# Patient Record
Sex: FEMALE | Race: WHITE | Employment: FULL TIME | URBAN - NONMETROPOLITAN AREA
[De-identification: names, ages, dates, MRNs, and addresses within clinical notes are randomized per-mention and may not be internally consistent; named-entity substitution may affect disease eponyms.]

---

## 2017-08-21 ENCOUNTER — HOSPITAL ENCOUNTER (EMERGENCY)
Age: 32
Discharge: HOME | End: 2017-08-21
Payer: COMMERCIAL

## 2017-08-21 VITALS
OXYGEN SATURATION: 98 % | TEMPERATURE: 98.3 F | HEART RATE: 82 BPM | RESPIRATION RATE: 20 BRPM | BODY MASS INDEX: 45.66 KG/M2 | SYSTOLIC BLOOD PRESSURE: 202 MMHG | WEIGHT: 266 LBS | DIASTOLIC BLOOD PRESSURE: 130 MMHG

## 2017-08-21 DIAGNOSIS — I10 UNCONTROLLED HYPERTENSION: Primary | ICD-10-CM

## 2017-08-21 DIAGNOSIS — J01.10 ACUTE NON-RECURRENT FRONTAL SINUSITIS: ICD-10-CM

## 2017-08-21 DIAGNOSIS — Z86.79 HISTORY OF ESSENTIAL HYPERTENSION: ICD-10-CM

## 2017-08-21 PROCEDURE — 6370000000 HC RX 637 (ALT 250 FOR IP): Performed by: NURSE PRACTITIONER

## 2017-08-21 PROCEDURE — 99212 OFFICE O/P EST SF 10 MIN: CPT

## 2017-08-21 PROCEDURE — 99213 OFFICE O/P EST LOW 20 MIN: CPT | Performed by: NURSE PRACTITIONER

## 2017-08-21 RX ORDER — CLONIDINE HYDROCHLORIDE 0.1 MG/1
0.1 TABLET ORAL ONCE
Status: COMPLETED | OUTPATIENT
Start: 2017-08-21 | End: 2017-08-21

## 2017-08-21 RX ORDER — LISINOPRIL 10 MG/1
10 TABLET ORAL DAILY
Qty: 30 TABLET | Refills: 0 | Status: SHIPPED | OUTPATIENT
Start: 2017-08-21 | End: 2019-06-20 | Stop reason: ALTCHOICE

## 2017-08-21 RX ORDER — AMOXICILLIN AND CLAVULANATE POTASSIUM 875; 125 MG/1; MG/1
1 TABLET, FILM COATED ORAL 2 TIMES DAILY
Qty: 20 TABLET | Refills: 0 | Status: SHIPPED | OUTPATIENT
Start: 2017-08-21 | End: 2017-08-31

## 2017-08-21 RX ADMIN — CLONIDINE HYDROCHLORIDE 0.1 MG: 0.1 TABLET ORAL at 19:27

## 2017-08-21 ASSESSMENT — ENCOUNTER SYMPTOMS
WHEEZING: 0
COUGH: 1
TROUBLE SWALLOWING: 0
CHEST TIGHTNESS: 0
SINUS PRESSURE: 1
SHORTNESS OF BREATH: 0
SORE THROAT: 0
RHINORRHEA: 1
VOMITING: 0

## 2017-08-21 ASSESSMENT — PAIN SCALES - GENERAL: PAINLEVEL_OUTOF10: 5

## 2017-08-21 ASSESSMENT — PAIN DESCRIPTION - DESCRIPTORS: DESCRIPTORS: ACHING

## 2017-08-21 ASSESSMENT — PAIN DESCRIPTION - PAIN TYPE: TYPE: ACUTE PAIN

## 2017-08-21 ASSESSMENT — PAIN DESCRIPTION - LOCATION: LOCATION: HEAD

## 2017-08-22 ENCOUNTER — TELEPHONE (OUTPATIENT)
Dept: INTERNAL MEDICINE CLINIC | Age: 32
End: 2017-08-22

## 2019-06-20 ENCOUNTER — OFFICE VISIT (OUTPATIENT)
Dept: FAMILY MEDICINE CLINIC | Age: 34
End: 2019-06-20
Payer: COMMERCIAL

## 2019-06-20 VITALS
WEIGHT: 273 LBS | DIASTOLIC BLOOD PRESSURE: 96 MMHG | SYSTOLIC BLOOD PRESSURE: 136 MMHG | BODY MASS INDEX: 46.61 KG/M2 | HEIGHT: 64 IN | TEMPERATURE: 98.7 F | RESPIRATION RATE: 20 BRPM | HEART RATE: 80 BPM

## 2019-06-20 DIAGNOSIS — F32.1 CURRENT MODERATE EPISODE OF MAJOR DEPRESSIVE DISORDER WITHOUT PRIOR EPISODE (HCC): ICD-10-CM

## 2019-06-20 DIAGNOSIS — I10 ESSENTIAL HYPERTENSION: ICD-10-CM

## 2019-06-20 DIAGNOSIS — Z13.31 POSITIVE DEPRESSION SCREENING: ICD-10-CM

## 2019-06-20 DIAGNOSIS — E11.65 TYPE 2 DIABETES MELLITUS WITH HYPERGLYCEMIA, WITHOUT LONG-TERM CURRENT USE OF INSULIN (HCC): Primary | ICD-10-CM

## 2019-06-20 PROCEDURE — G8431 POS CLIN DEPRES SCRN F/U DOC: HCPCS | Performed by: FAMILY MEDICINE

## 2019-06-20 PROCEDURE — 99204 OFFICE O/P NEW MOD 45 MIN: CPT | Performed by: FAMILY MEDICINE

## 2019-06-20 PROCEDURE — G0444 DEPRESSION SCREEN ANNUAL: HCPCS | Performed by: FAMILY MEDICINE

## 2019-06-20 RX ORDER — LOSARTAN POTASSIUM AND HYDROCHLOROTHIAZIDE 25; 100 MG/1; MG/1
TABLET ORAL
Qty: 30 TABLET | Refills: 11 | Status: CANCELLED | OUTPATIENT
Start: 2019-06-20

## 2019-06-20 RX ORDER — ALOGLIPTIN 25 MG/1
TABLET, FILM COATED ORAL
Refills: 5 | COMMUNITY
Start: 2019-05-13 | End: 2019-07-25 | Stop reason: ALTCHOICE

## 2019-06-20 RX ORDER — GLIPIZIDE 5 MG/1
TABLET, FILM COATED, EXTENDED RELEASE ORAL
Refills: 10 | COMMUNITY
Start: 2019-06-08 | End: 2020-02-07

## 2019-06-20 RX ORDER — GLIPIZIDE 5 MG/1
TABLET, FILM COATED, EXTENDED RELEASE ORAL
Qty: 30 TABLET | Refills: 11 | Status: CANCELLED | OUTPATIENT
Start: 2019-06-20

## 2019-06-20 RX ORDER — METFORMIN HYDROCHLORIDE 1000 MG/1
1000 TABLET, FILM COATED, EXTENDED RELEASE ORAL 2 TIMES DAILY
Qty: 60 TABLET | Refills: 11 | Status: SHIPPED | OUTPATIENT
Start: 2019-06-20 | End: 2020-06-22

## 2019-06-20 RX ORDER — LOSARTAN POTASSIUM AND HYDROCHLOROTHIAZIDE 25; 100 MG/1; MG/1
TABLET ORAL
Refills: 7 | COMMUNITY
Start: 2019-06-14 | End: 2020-02-07 | Stop reason: ALTCHOICE

## 2019-06-20 RX ORDER — ESCITALOPRAM OXALATE 10 MG/1
10 TABLET ORAL DAILY
Qty: 30 TABLET | Refills: 3 | Status: SHIPPED | OUTPATIENT
Start: 2019-06-20 | End: 2019-11-01 | Stop reason: SDUPTHER

## 2019-06-20 ASSESSMENT — PATIENT HEALTH QUESTIONNAIRE - PHQ9
9. THOUGHTS THAT YOU WOULD BE BETTER OFF DEAD, OR OF HURTING YOURSELF: 1
SUM OF ALL RESPONSES TO PHQ QUESTIONS 1-9: 16
7. TROUBLE CONCENTRATING ON THINGS, SUCH AS READING THE NEWSPAPER OR WATCHING TELEVISION: 3
4. FEELING TIRED OR HAVING LITTLE ENERGY: 2
10. IF YOU CHECKED OFF ANY PROBLEMS, HOW DIFFICULT HAVE THESE PROBLEMS MADE IT FOR YOU TO DO YOUR WORK, TAKE CARE OF THINGS AT HOME, OR GET ALONG WITH OTHER PEOPLE: 1
SUM OF ALL RESPONSES TO PHQ QUESTIONS 1-9: 16
6. FEELING BAD ABOUT YOURSELF - OR THAT YOU ARE A FAILURE OR HAVE LET YOURSELF OR YOUR FAMILY DOWN: 3
SUM OF ALL RESPONSES TO PHQ9 QUESTIONS 1 & 2: 4
8. MOVING OR SPEAKING SO SLOWLY THAT OTHER PEOPLE COULD HAVE NOTICED. OR THE OPPOSITE, BEING SO FIGETY OR RESTLESS THAT YOU HAVE BEEN MOVING AROUND A LOT MORE THAN USUAL: 0
1. LITTLE INTEREST OR PLEASURE IN DOING THINGS: 3
5. POOR APPETITE OR OVEREATING: 1
2. FEELING DOWN, DEPRESSED OR HOPELESS: 1
3. TROUBLE FALLING OR STAYING ASLEEP: 2

## 2019-06-20 ASSESSMENT — ENCOUNTER SYMPTOMS
DIARRHEA: 0
RHINORRHEA: 0
CONSTIPATION: 0
ABDOMINAL PAIN: 0
VOMITING: 0
SORE THROAT: 0
BACK PAIN: 0
NAUSEA: 0

## 2019-06-21 NOTE — PROGRESS NOTES
disturbance. Negative for suicidal ideas. The patient is nervous/anxious. Past Medical History:   Diagnosis Date    Diabetes mellitus ([address]     Hypertension       No past surgical history on file. Family History   Problem Relation Age of Onset    Diabetes Mother     Depression Mother     High Blood Pressure Father     Other Father     High Blood Pressure Paternal Grandmother      Social History     Tobacco Use    Smoking status: Former Smoker     Years: 2.00     Types: Cigarettes     Last attempt to quit: 10/1/2016     Years since quittin.7    Smokeless tobacco: Never Used    Tobacco comment: 2-3 cigarettes per day   Substance Use Topics    Alcohol use: Yes     Comment: social      Current Outpatient Medications   Medication Sig Dispense Refill    alogliptin (NESINA) 25 MG TABS tablet TAKE 1 TABLET PO QD  5    glipiZIDE (GLUCOTROL XL) 5 MG extended release tablet TK 1 T PO ONCE D WITH BREAKFAST  10    losartan-hydrochlorothiazide (HYZAAR) 100-25 MG per tablet TK 1 T PO QD  7    metFORMIN, MOD, (GLUMETZA) 1000 MG extended release tablet Take 1 tablet by mouth 2 times daily 60 tablet 11    escitalopram (LEXAPRO) 10 MG tablet Take 1 tablet by mouth daily 30 tablet 3     No current facility-administered medications for this visit. No Known Allergies  Health Maintenance   Topic Date Due    Varicella Vaccine (1 of 2 - 13+ 2-dose series) 1996    HIV screen  1998    DTaP/Tdap/Td vaccine (1 - Tdap) 2002    Cervical cancer screen  2004    Potassium monitoring  02/10/2017    Creatinine monitoring  02/10/2017    Flu vaccine (Season Ended) 2019    Pneumococcal 0-64 years Vaccine  Aged Out         Objective:     Physical Exam   Constitutional: She is oriented to person, place, and time. She appears well-developed and well-nourished. HENT:   Head: Normocephalic and atraumatic.    Right Ear: External ear normal.   Left Ear: External ear normal.   Nose: Nose normal.   Mouth/Throat: Oropharynx is clear and moist. No oropharyngeal exudate. Eyes: Pupils are equal, round, and reactive to light. Conjunctivae and EOM are normal. Right eye exhibits no discharge. Left eye exhibits no discharge. Neck: No thyromegaly present. No carotid bruits   Cardiovascular: Normal rate, regular rhythm, normal heart sounds and intact distal pulses. Exam reveals no gallop and no friction rub. No murmur heard. Pulmonary/Chest: Breath sounds normal. She has no wheezes. She has no rales. Abdominal: Soft. Bowel sounds are normal. She exhibits no distension and no mass. There is no tenderness. There is no rebound and no guarding. Musculoskeletal: Normal range of motion. She exhibits no edema or tenderness. Lymphadenopathy:     She has no cervical adenopathy. Neurological: She is alert and oriented to person, place, and time. She displays normal reflexes. No cranial nerve deficit. Coordination normal.   Skin: Skin is warm and dry. No rash noted. No erythema. No pallor. Psychiatric: She has a normal mood and affect. Vitals reviewed. BP (!) 136/96 Comment: left  Pulse 80   Temp 98.7 °F (37.1 °C) (Oral)   Resp 20   Ht 5' 4.25\" (1.632 m)   Wt 273 lb (123.8 kg)   BMI 46.50 kg/m²       Impression/Plan:  1. Type 2 diabetes mellitus with hyperglycemia, without long-term current use of insulin ([facility]    2. Current moderate episode of major depressive disorder without prior episode [facility]    3. Essential hypertension    4.  Positive depression screening      Requested Prescriptions     Signed Prescriptions Disp Refills    metFORMIN, MOD, (GLUMETZA) 1000 MG extended release tablet 60 tablet 11     Sig: Take 1 tablet by mouth 2 times daily    escitalopram (LEXAPRO) 10 MG tablet 30 tablet 3     Sig: Take 1 tablet by mouth daily     Orders Placed This Encounter   Procedures    Lipid Panel     Standing Status:   Future     Standing Expiration Date:   6/19/2020     Order Specific Question:   Is Patient Fasting?/# of Hours     Answer:   yes/12    Comprehensive Metabolic Panel     Standing Status:   Future     Standing Expiration Date:   6/19/2020    Hemoglobin A1C     Standing Status:   Future     Standing Expiration Date:   6/19/2020    Microalbumin / Creatinine Urine Ratio     Standing Status:   Future     Standing Expiration Date:   6/20/2020    Positive Screen for Clinical Depression with a Documented Follow-up Plan    45 minutes face-to-face under for some time spent on counseling regarding treatment of depression, diabetic diet exercise and diet for control of blood sugars and blood pressure as well as weight control    Patient giveneducational materials - see patient instructions. Discussed use, benefit, and side effects of prescribed medications. All patient questions answered. Pt voiced understanding. Reviewed health maintenance. Patient agreedwith treatment plan. Follow up as directed. **This report has been created using voice recognition software. It may contain minor errorswhich are inherent in voice recognition technology. **       Electronically signed by Mark Cm MD on 6/20/2019 at 8:01 PM

## 2019-07-17 LAB
ALBUMIN SERPL-MCNC: 4.4 G/DL (ref 3.2–5.3)
ALK PHOSPHATASE: 88 U/L (ref 39–130)
ALT SERPL-CCNC: 160 U/L (ref 0–31)
ANION GAP SERPL CALCULATED.3IONS-SCNC: 10 MMOL/L (ref 4–12)
AST SERPL-CCNC: 73 U/L (ref 0–41)
AVERAGE GLUCOSE: 197 MG/DL (ref 66–114)
BILIRUB SERPL-MCNC: 0.8 MG/DL (ref 0.3–1.2)
BUN BLDV-MCNC: 11 MG/DL (ref 5–23)
CALCIUM SERPL-MCNC: 9.3 MG/DL (ref 8.5–10.5)
CHLORIDE BLD-SCNC: 99 MMOL/L (ref 98–109)
CHOLESTEROL/HDL RATIO: 4.5 (ref 1–5)
CHOLESTEROL: 210 MG/DL (ref 150–200)
CO2: 26 MMOL/L (ref 22–32)
CREAT SERPL-MCNC: 0.62 MG/DL (ref 0.4–1)
CREATINE, URINE: 71.94 MG/DL
EGFR AFRICAN AMERICAN: >60 ML/MIN/1.73SQ.M
EGFR IF NONAFRICAN AMERICAN: >60 ML/MIN/1.73SQ.M
GLUCOSE: 156 MG/DL (ref 65–99)
HBA1C MFR BLD: 8.5 %
HDLC SERPL-MCNC: 47 MG/DL
LDL CHOLESTEROL CALCULATED: 135 MG/DL
LDL/HDL RATIO: 2.9
MICROALBUMIN/CREAT 24H UR: 3.1 MG/DL (ref 0–1.9)
MICROALBUMIN/CREAT UR-RTO: 43.1 MG/G CREAT (ref 0–30)
POTASSIUM SERPL-SCNC: 4.1 MMOL/L (ref 3.5–5)
SODIUM BLD-SCNC: 135 MMOL/L (ref 134–146)
TOTAL PROTEIN: 7 G/DL (ref 6–8)
TRIGL SERPL-MCNC: 140 MG/DL (ref 27–150)
VLDLC SERPL CALC-MCNC: 28 MG/DL (ref 0–30)

## 2019-07-25 ENCOUNTER — OFFICE VISIT (OUTPATIENT)
Dept: FAMILY MEDICINE CLINIC | Age: 34
End: 2019-07-25
Payer: COMMERCIAL

## 2019-07-25 VITALS
RESPIRATION RATE: 16 BRPM | HEART RATE: 72 BPM | WEIGHT: 267.2 LBS | TEMPERATURE: 98 F | BODY MASS INDEX: 45.51 KG/M2 | DIASTOLIC BLOOD PRESSURE: 80 MMHG | SYSTOLIC BLOOD PRESSURE: 130 MMHG

## 2019-07-25 DIAGNOSIS — E11.65 TYPE 2 DIABETES MELLITUS WITH HYPERGLYCEMIA, WITHOUT LONG-TERM CURRENT USE OF INSULIN (HCC): Primary | ICD-10-CM

## 2019-07-25 DIAGNOSIS — B37.2 YEAST DERMATITIS: ICD-10-CM

## 2019-07-25 DIAGNOSIS — F32.1 CURRENT MODERATE EPISODE OF MAJOR DEPRESSIVE DISORDER WITHOUT PRIOR EPISODE (HCC): ICD-10-CM

## 2019-07-25 PROCEDURE — 99213 OFFICE O/P EST LOW 20 MIN: CPT | Performed by: FAMILY MEDICINE

## 2019-07-25 RX ORDER — NYSTATIN 100000 [USP'U]/G
POWDER TOPICAL
Qty: 60 G | Refills: 1 | Status: SHIPPED | OUTPATIENT
Start: 2019-07-25 | End: 2020-03-02 | Stop reason: ALTCHOICE

## 2019-07-25 ASSESSMENT — ENCOUNTER SYMPTOMS
VOMITING: 0
RHINORRHEA: 0
DIARRHEA: 0
ABDOMINAL PAIN: 0
COUGH: 0
EYES NEGATIVE: 1
CHEST TIGHTNESS: 0
BACK PAIN: 0
SORE THROAT: 0
SHORTNESS OF BREATH: 0
NAUSEA: 0

## 2019-07-26 NOTE — PROGRESS NOTES
rales.   Abdominal: Soft. She exhibits no mass. There is no tenderness. There is no guarding. Musculoskeletal: She exhibits no edema or tenderness. Neurological: She is alert and oriented to person, place, and time. No cranial nerve deficit. Skin: Skin is warm and dry. Rash noted. She is not diaphoretic. /80 (Site: Left Upper Arm)   Pulse 72   Temp 98 °F (36.7 °C) (Oral)   Resp 16   Wt 267 lb 3.2 oz (121.2 kg)   BMI 45.51 kg/m²       Impression/Plan:  1. Type 2 diabetes mellitus with hyperglycemia, without long-term current use of insulin ([address]    2. Yeast dermatitis    3. Current moderate episode of major depressive disorder without prior episode ([facility])      Requested Prescriptions     Signed Prescriptions Disp Refills    Dulaglutide (TRULICITY) 4.48 NK/0.3GR SOPN 4 pen 5     Sig: Inject 0.75 mg into the skin once a week    nystatin (MYCOSTATIN) 425649 UNIT/GM powder 60 g 1     Sig: Apply topically 4 times daily. Orders Placed This Encounter   Procedures    Hemoglobin A1C     Standing Status:   Future     Standing Expiration Date:   7/24/2020    Comprehensive Metabolic Panel     Standing Status:   Future     Standing Expiration Date:   7/24/2020    Lipid Panel     Standing Status:   Future     Standing Expiration Date:   7/24/2020     Order Specific Question:   Is Patient Fasting?/# of Hours     Answer:   yes/12       Patient giveneducational materials - see patient instructions. Discussed use, benefit, and side effects of prescribed medications. All patient questions answered. Pt voiced understanding. Reviewed health maintenance. Patient agreedwith treatment plan. Follow up as directed. Bouchra weight loss for improved sugars and to help lower transaminase levels. We will continue to monitor  **This report has been created using voice recognition software. It may contain minor errorswhich are inherent in voice recognition technology. **       Electronically signed by Lyle Gentile

## 2019-08-22 RX ORDER — NYSTATIN 100000 U/G
CREAM TOPICAL
Qty: 60 G | Refills: 1 | Status: SHIPPED | OUTPATIENT
Start: 2019-08-22 | End: 2021-05-03 | Stop reason: SDUPTHER

## 2019-08-22 NOTE — TELEPHONE ENCOUNTER
1. Trulicity is too expensive. She says it works very well, but even with discount card it is $400.     2. Nystatin powder has almost cleared up the yeast in her groin/axilla.  Requesting Nystatin cream now to clear up the rest.     [phone]  [facility]  [facility]

## 2019-08-26 NOTE — TELEPHONE ENCOUNTER
Left VM that scripts were sent and that she can stop by and get a discount card for Ozempic, left up front with her name on it.

## 2019-11-01 RX ORDER — ESCITALOPRAM OXALATE 10 MG/1
10 TABLET ORAL DAILY
Qty: 30 TABLET | Refills: 5 | Status: SHIPPED | OUTPATIENT
Start: 2019-11-01 | End: 2020-05-26

## 2020-02-07 ENCOUNTER — OFFICE VISIT (OUTPATIENT)
Dept: FAMILY MEDICINE CLINIC | Age: 35
End: 2020-02-07
Payer: COMMERCIAL

## 2020-02-07 VITALS
DIASTOLIC BLOOD PRESSURE: 72 MMHG | RESPIRATION RATE: 16 BRPM | TEMPERATURE: 98.3 F | BODY MASS INDEX: 45.03 KG/M2 | WEIGHT: 264.4 LBS | HEART RATE: 72 BPM | SYSTOLIC BLOOD PRESSURE: 126 MMHG

## 2020-02-07 PROCEDURE — G8431 POS CLIN DEPRES SCRN F/U DOC: HCPCS | Performed by: FAMILY MEDICINE

## 2020-02-07 PROCEDURE — G0444 DEPRESSION SCREEN ANNUAL: HCPCS | Performed by: FAMILY MEDICINE

## 2020-02-07 PROCEDURE — 99213 OFFICE O/P EST LOW 20 MIN: CPT | Performed by: FAMILY MEDICINE

## 2020-02-07 RX ORDER — HYDROCHLOROTHIAZIDE 25 MG/1
TABLET ORAL
COMMUNITY
Start: 2020-02-02 | End: 2020-03-02 | Stop reason: SDUPTHER

## 2020-02-07 RX ORDER — ACYCLOVIR 50 MG/G
OINTMENT TOPICAL
Qty: 15 G | Refills: 1 | Status: SHIPPED | OUTPATIENT
Start: 2020-02-07 | End: 2020-02-14

## 2020-02-07 RX ORDER — LOSARTAN POTASSIUM 100 MG/1
TABLET ORAL
COMMUNITY
Start: 2020-02-02 | End: 2020-03-02 | Stop reason: SDUPTHER

## 2020-02-07 SDOH — ECONOMIC STABILITY: TRANSPORTATION INSECURITY
IN THE PAST 12 MONTHS, HAS THE LACK OF TRANSPORTATION KEPT YOU FROM MEDICAL APPOINTMENTS OR FROM GETTING MEDICATIONS?: NO

## 2020-02-07 SDOH — ECONOMIC STABILITY: FOOD INSECURITY: WITHIN THE PAST 12 MONTHS, YOU WORRIED THAT YOUR FOOD WOULD RUN OUT BEFORE YOU GOT MONEY TO BUY MORE.: NEVER TRUE

## 2020-02-07 SDOH — ECONOMIC STABILITY: TRANSPORTATION INSECURITY
IN THE PAST 12 MONTHS, HAS LACK OF TRANSPORTATION KEPT YOU FROM MEETINGS, WORK, OR FROM GETTING THINGS NEEDED FOR DAILY LIVING?: NO

## 2020-02-07 SDOH — ECONOMIC STABILITY: INCOME INSECURITY: HOW HARD IS IT FOR YOU TO PAY FOR THE VERY BASICS LIKE FOOD, HOUSING, MEDICAL CARE, AND HEATING?: NOT HARD AT ALL

## 2020-02-07 SDOH — ECONOMIC STABILITY: FOOD INSECURITY: WITHIN THE PAST 12 MONTHS, THE FOOD YOU BOUGHT JUST DIDN'T LAST AND YOU DIDN'T HAVE MONEY TO GET MORE.: NEVER TRUE

## 2020-02-07 ASSESSMENT — PATIENT HEALTH QUESTIONNAIRE - PHQ9
6. FEELING BAD ABOUT YOURSELF - OR THAT YOU ARE A FAILURE OR HAVE LET YOURSELF OR YOUR FAMILY DOWN: 1
SUM OF ALL RESPONSES TO PHQ9 QUESTIONS 1 & 2: 3
5. POOR APPETITE OR OVEREATING: 1
1. LITTLE INTEREST OR PLEASURE IN DOING THINGS: 2
8. MOVING OR SPEAKING SO SLOWLY THAT OTHER PEOPLE COULD HAVE NOTICED. OR THE OPPOSITE, BEING SO FIGETY OR RESTLESS THAT YOU HAVE BEEN MOVING AROUND A LOT MORE THAN USUAL: 1
4. FEELING TIRED OR HAVING LITTLE ENERGY: 2
7. TROUBLE CONCENTRATING ON THINGS, SUCH AS READING THE NEWSPAPER OR WATCHING TELEVISION: 3
10. IF YOU CHECKED OFF ANY PROBLEMS, HOW DIFFICULT HAVE THESE PROBLEMS MADE IT FOR YOU TO DO YOUR WORK, TAKE CARE OF THINGS AT HOME, OR GET ALONG WITH OTHER PEOPLE: 0
2. FEELING DOWN, DEPRESSED OR HOPELESS: 1
9. THOUGHTS THAT YOU WOULD BE BETTER OFF DEAD, OR OF HURTING YOURSELF: 0
SUM OF ALL RESPONSES TO PHQ QUESTIONS 1-9: 12
SUM OF ALL RESPONSES TO PHQ QUESTIONS 1-9: 12
3. TROUBLE FALLING OR STAYING ASLEEP: 1

## 2020-02-08 ASSESSMENT — ENCOUNTER SYMPTOMS
NAUSEA: 0
COUGH: 0
SHORTNESS OF BREATH: 0
BACK PAIN: 0
CHEST TIGHTNESS: 0
EYES NEGATIVE: 1
DIARRHEA: 0
ABDOMINAL PAIN: 0
VOMITING: 0
RHINORRHEA: 0
SORE THROAT: 0

## 2020-02-08 NOTE — PROGRESS NOTES
On the basis of positive PHQ-9 screening (PHQ-9 Total Score: 12), the following plan was implemented: patient declines further evaluation/treatment for depression. Patient will follow-up in 3 month(s) with PCP     [address] [place] [place] [address]  Dept: [phone]  Dept Fax: [phone]  Loc: [phone]  PROGRESS NOTE      VisitDate: 2/7/2020    Khadra Jones is a 37 y.o. female who presents today for:     Chief Complaint   Patient presents with    Mouth Lesions     very painful, both sides of mouth, makes whole face hurts, gets them with stress    Hyperglycemia     hasn't been taking trulicity or ozempic, insurance wouldn't cover    Depression     positive screening         Subjective:  HPI  Patient comes in follow-up diabetes quit taking her Trulicity ER and Ozempic because of cost.  Not monitoring blood glucose levels. Has been running high previously. Also complains of cold sores been present for over a week painful not responding to over-the-counter treatments    Review of Systems   Constitutional: Negative for activity change, appetite change, fatigue and fever. HENT: Positive for mouth sores. Negative for congestion, rhinorrhea and sore throat. Eyes: Negative. Respiratory: Negative for cough, chest tightness and shortness of breath. Cardiovascular: Negative for chest pain and palpitations. Gastrointestinal: Negative for abdominal pain, diarrhea, nausea and vomiting. Genitourinary: Negative for dysuria and urgency. Musculoskeletal: Negative for arthralgias and back pain. Neurological: Negative for dizziness and headaches. Psychiatric/Behavioral: Negative for dysphoric mood. The patient is not nervous/anxious. Past Medical History:   Diagnosis Date    Diabetes mellitus ([address]     Hypertension       No past surgical history on file.   Family History   Problem Relation Age of Onset    Diabetes Mother     Depression Mother     High Blood Pressure Father     Other Father     High Blood Pressure Paternal Grandmother      Social History     Tobacco Use    Smoking status: Former Smoker     Years: 2.00     Types: Cigarettes     Last attempt to quit: 10/1/2016     Years since quitting: 3.3    Smokeless tobacco: Never Used    Tobacco comment: 2-3 cigarettes per day   Substance Use Topics    Alcohol use: Yes     Comment: social      Current Outpatient Medications   Medication Sig Dispense Refill    hydrochlorothiazide (HYDRODIURIL) 25 MG tablet TK 1 T PO  QD WITH LOSARTAN      losartan (COZAAR) 100 MG tablet TK 1 T PO QD WITH HYDROCHLOROTHIAZIDE      acyclovir (ZOVIRAX) 5 % ointment Apply topically 5 times daily. 15 g 1    empagliflozin (JARDIANCE) 25 MG tablet Take 1 tablet by mouth daily 90 tablet 1    escitalopram (LEXAPRO) 10 MG tablet TAKE 1 TABLET BY MOUTH DAILY 30 tablet 5    nystatin (MYCOSTATIN) 589526 UNIT/GM cream Apply topically 4 times daily. 60 g 1    nystatin (MYCOSTATIN) 474089 UNIT/GM powder Apply topically 4 times daily. 60 g 1    metFORMIN, MOD, (GLUMETZA) 1000 MG extended release tablet Take 1 tablet by mouth 2 times daily 60 tablet 11     No current facility-administered medications for this visit.       Allergies   Allergen Reactions    Fluconazole Rash     Health Maintenance   Topic Date Due    Varicella vaccine (1 of 2 - 2-dose childhood series) 06/04/1984    Pneumococcal 0-64 years Vaccine (1 of 1 - PPSV23) 06/04/1989    Diabetic foot exam  06/04/1993    Diabetic retinal exam  06/04/1993    DTaP/Tdap/Td vaccine (5 - Tdap) 06/04/1994    HIV screen  06/04/1998    Hepatitis B vaccine (1 of 3 - Risk 3-dose series) 06/04/2002    Cervical cancer screen  06/04/2004    Flu vaccine (1) 09/01/2019    A1C test (Diabetic or Prediabetic)  07/16/2020    Diabetic microalbuminuria test  07/16/2020    Lipid screen  07/16/2020    Potassium monitoring  07/16/2020    Creatinine

## 2020-03-02 RX ORDER — GLIPIZIDE 5 MG/1
TABLET, FILM COATED, EXTENDED RELEASE ORAL
Qty: 90 TABLET | Refills: 0 | Status: SHIPPED | OUTPATIENT
Start: 2020-03-02 | End: 2020-05-26

## 2020-03-02 RX ORDER — HYDROCHLOROTHIAZIDE 25 MG/1
TABLET ORAL
Qty: 90 TABLET | Refills: 0 | Status: SHIPPED | OUTPATIENT
Start: 2020-03-02 | End: 2020-05-26

## 2020-03-02 RX ORDER — ALOGLIPTIN 25 MG/1
TABLET, FILM COATED ORAL
Qty: 90 TABLET | Refills: 0 | Status: SHIPPED | OUTPATIENT
Start: 2020-03-02 | End: 2020-05-26

## 2020-03-02 RX ORDER — LOSARTAN POTASSIUM 100 MG/1
TABLET ORAL
Qty: 90 TABLET | Refills: 0 | Status: SHIPPED | OUTPATIENT
Start: 2020-03-02 | End: 2020-05-26

## 2020-03-02 NOTE — TELEPHONE ENCOUNTER
Patient is calling in regarding the jardiance medication she was recently switched to. She said her insurance will not cover that medication at this time and she can not afford it. She said the 2 meds she was on previously were ok, the glipizide and alogliptin. She is asking if she should go back on those 2 until for now? Or what would be recommended for her? Please advise.

## 2020-05-25 RX ORDER — ALOGLIPTIN 25 MG/1
TABLET, FILM COATED ORAL
Qty: 90 TABLET | Refills: 0 | Status: CANCELLED | OUTPATIENT
Start: 2020-05-25

## 2020-05-26 ENCOUNTER — TELEPHONE (OUTPATIENT)
Dept: FAMILY MEDICINE CLINIC | Age: 35
End: 2020-05-26

## 2020-05-26 RX ORDER — ESCITALOPRAM OXALATE 10 MG/1
10 TABLET ORAL DAILY
Qty: 30 TABLET | Refills: 5 | Status: SHIPPED | OUTPATIENT
Start: 2020-05-26 | End: 2020-12-30 | Stop reason: SDUPTHER

## 2020-05-26 RX ORDER — ALOGLIPTIN 25 MG/1
TABLET, FILM COATED ORAL
Qty: 90 TABLET | Refills: 0 | Status: SHIPPED | OUTPATIENT
Start: 2020-05-26 | End: 2020-05-26 | Stop reason: ALTCHOICE

## 2020-05-26 RX ORDER — LOSARTAN POTASSIUM 100 MG/1
TABLET ORAL
Qty: 90 TABLET | Refills: 0 | Status: SHIPPED | OUTPATIENT
Start: 2020-05-26 | End: 2020-08-27

## 2020-05-26 RX ORDER — HYDROCHLOROTHIAZIDE 25 MG/1
TABLET ORAL
Qty: 90 TABLET | Refills: 0 | Status: SHIPPED | OUTPATIENT
Start: 2020-05-26 | End: 2020-08-31

## 2020-05-26 RX ORDER — GLIPIZIDE 5 MG/1
TABLET, FILM COATED, EXTENDED RELEASE ORAL
Qty: 90 TABLET | Refills: 0 | Status: SHIPPED | OUTPATIENT
Start: 2020-05-26 | End: 2020-08-31

## 2020-06-01 NOTE — TELEPHONE ENCOUNTER
Patient states that the Jamarcus Aryan is still cheaper with a discount card that she has than the Tradjenta. Jovan anne.

## 2020-06-05 RX ORDER — ALOGLIPTIN 25 MG/1
TABLET, FILM COATED ORAL
Qty: 90 TABLET | Refills: 0 | OUTPATIENT
Start: 2020-06-05

## 2020-06-22 RX ORDER — METFORMIN HYDROCHLORIDE 500 MG/1
TABLET, EXTENDED RELEASE ORAL
Qty: 120 TABLET | Refills: 3 | Status: SHIPPED | OUTPATIENT
Start: 2020-06-22 | End: 2020-11-10 | Stop reason: SDUPTHER

## 2020-07-14 RX ORDER — ALOGLIPTIN 25 MG/1
TABLET, FILM COATED ORAL
Qty: 90 TABLET | Refills: 0 | Status: SHIPPED | OUTPATIENT
Start: 2020-07-14 | End: 2020-10-19 | Stop reason: SDUPTHER

## 2020-07-14 NOTE — TELEPHONE ENCOUNTER
Patient states she was informed not to take the Alogliptin and she has been out for a couple weeks asking what she is supposed to do? She is not able to afford any of the other medications suggested to replace this so she would prefer to be prescribed the Alogliptin again and she will use a discount card. Please advise patient.      Jovan on [facility]

## 2020-08-18 ENCOUNTER — HOSPITAL ENCOUNTER (OUTPATIENT)
Age: 35
Discharge: HOME | End: 2020-08-18
Payer: COMMERCIAL

## 2020-08-18 ENCOUNTER — TELEPHONE (OUTPATIENT)
Dept: FAMILY MEDICINE CLINIC | Age: 35
End: 2020-08-18

## 2020-08-18 DIAGNOSIS — Z20.822 EXPOSURE TO COVID-19 VIRUS: ICD-10-CM

## 2020-08-18 PROCEDURE — 99211 OFF/OP EST MAY X REQ PHY/QHP: CPT

## 2020-08-18 PROCEDURE — U0003 INFECTIOUS AGENT DETECTION BY NUCLEIC ACID (DNA OR RNA); SEVERE ACUTE RESPIRATORY SYNDROME CORONAVIRUS 2 (SARS-COV-2) (CORONAVIRUS DISEASE [COVID-19]), AMPLIFIED PROBE TECHNIQUE, MAKING USE OF HIGH THROUGHPUT TECHNOLOGIES AS DESCRIBED BY CMS-2020-01-R: HCPCS

## 2020-08-20 LAB — SARS-COV-2: NOT DETECTED

## 2020-08-27 RX ORDER — LOSARTAN POTASSIUM 100 MG/1
TABLET ORAL
Qty: 90 TABLET | Refills: 1 | Status: SHIPPED | OUTPATIENT
Start: 2020-08-27 | End: 2020-12-30 | Stop reason: SDUPTHER

## 2020-08-31 RX ORDER — GLIPIZIDE 5 MG/1
TABLET, FILM COATED, EXTENDED RELEASE ORAL
Qty: 90 TABLET | Refills: 0 | Status: SHIPPED | OUTPATIENT
Start: 2020-08-31 | End: 2020-12-02 | Stop reason: SDUPTHER

## 2020-08-31 RX ORDER — HYDROCHLOROTHIAZIDE 25 MG/1
TABLET ORAL
Qty: 90 TABLET | Refills: 0 | Status: SHIPPED | OUTPATIENT
Start: 2020-08-31 | End: 2020-12-02 | Stop reason: SDUPTHER

## 2020-10-15 ENCOUNTER — TELEPHONE (OUTPATIENT)
Dept: FAMILY MEDICINE CLINIC | Age: 35
End: 2020-10-15

## 2020-10-15 NOTE — LETTER
[address]  [address]  [facility] [address]  Phone: [phone]  Fax: [phone]    Edie Evans MD        October 15, 2020     [address] [place] [address]      Dear Almita Hudson: This letter is a reminder that your labs are overdue. We have enclosed the labs for you to do at your soonest convenience. Please make sure you fast for these.      Sincerely,        Edie Evans MD

## 2020-10-15 NOTE — TELEPHONE ENCOUNTER
Patient is due for appt and lab work to include a1c. Please advise on lab orders. Left voicemail for patient to call back and schedule appt. Mail lab orders to patient.

## 2020-10-19 RX ORDER — ALOGLIPTIN 25 MG/1
TABLET, FILM COATED ORAL
Qty: 90 TABLET | Refills: 0 | Status: SHIPPED | OUTPATIENT
Start: 2020-10-19 | End: 2021-01-21 | Stop reason: SDUPTHER

## 2020-11-10 RX ORDER — METFORMIN HYDROCHLORIDE 500 MG/1
TABLET, EXTENDED RELEASE ORAL
Qty: 120 TABLET | Refills: 3 | Status: SHIPPED | OUTPATIENT
Start: 2020-11-10 | End: 2021-03-25

## 2020-11-20 ENCOUNTER — TELEPHONE (OUTPATIENT)
Dept: FAMILY MEDICINE CLINIC | Age: 35
End: 2020-11-20

## 2020-11-20 NOTE — TELEPHONE ENCOUNTER
Spoke with the patient who would like a order to be tested for covid 19.  Please advise [phone]

## 2020-12-03 RX ORDER — HYDROCHLOROTHIAZIDE 25 MG/1
TABLET ORAL
Qty: 90 TABLET | Refills: 3 | Status: SHIPPED | OUTPATIENT
Start: 2020-12-03 | End: 2021-11-18 | Stop reason: SDUPTHER

## 2020-12-03 RX ORDER — GLIPIZIDE 5 MG/1
TABLET, FILM COATED, EXTENDED RELEASE ORAL
Qty: 90 TABLET | Refills: 3 | Status: SHIPPED | OUTPATIENT
Start: 2020-12-03 | End: 2021-08-16 | Stop reason: ALTCHOICE

## 2020-12-30 ENCOUNTER — OFFICE VISIT (OUTPATIENT)
Dept: FAMILY MEDICINE CLINIC | Age: 35
End: 2020-12-30
Payer: COMMERCIAL

## 2020-12-30 VITALS
TEMPERATURE: 98.1 F | SYSTOLIC BLOOD PRESSURE: 144 MMHG | RESPIRATION RATE: 20 BRPM | BODY MASS INDEX: 45.82 KG/M2 | HEIGHT: 65 IN | DIASTOLIC BLOOD PRESSURE: 90 MMHG | WEIGHT: 275 LBS | HEART RATE: 88 BPM

## 2020-12-30 DIAGNOSIS — I10 ESSENTIAL HYPERTENSION: ICD-10-CM

## 2020-12-30 DIAGNOSIS — E78.5 HYPERLIPIDEMIA, UNSPECIFIED HYPERLIPIDEMIA TYPE: ICD-10-CM

## 2020-12-30 DIAGNOSIS — E11.65 UNCONTROLLED TYPE 2 DIABETES MELLITUS WITH HYPERGLYCEMIA (HCC): Primary | ICD-10-CM

## 2020-12-30 PROCEDURE — 90686 IIV4 VACC NO PRSV 0.5 ML IM: CPT | Performed by: FAMILY MEDICINE

## 2020-12-30 PROCEDURE — 99214 OFFICE O/P EST MOD 30 MIN: CPT | Performed by: FAMILY MEDICINE

## 2020-12-30 PROCEDURE — 90471 IMMUNIZATION ADMIN: CPT | Performed by: FAMILY MEDICINE

## 2020-12-30 RX ORDER — LOSARTAN POTASSIUM 100 MG/1
TABLET ORAL
Qty: 90 TABLET | Refills: 1 | Status: SHIPPED | OUTPATIENT
Start: 2020-12-30 | End: 2021-09-02 | Stop reason: SDUPTHER

## 2020-12-30 RX ORDER — ESCITALOPRAM OXALATE 20 MG/1
20 TABLET ORAL DAILY
Qty: 90 TABLET | Refills: 3 | Status: SHIPPED | OUTPATIENT
Start: 2020-12-30 | End: 2021-10-20

## 2021-01-02 PROBLEM — E11.65 UNCONTROLLED TYPE 2 DIABETES MELLITUS WITH HYPERGLYCEMIA (HCC): Status: ACTIVE | Noted: 2021-01-02

## 2021-01-02 PROBLEM — E78.5 HYPERLIPIDEMIA: Status: ACTIVE | Noted: 2021-01-02

## 2021-01-02 PROBLEM — I10 ESSENTIAL HYPERTENSION: Status: ACTIVE | Noted: 2021-01-02

## 2021-01-02 ASSESSMENT — ENCOUNTER SYMPTOMS
ABDOMINAL PAIN: 0
BACK PAIN: 0
CHEST TIGHTNESS: 0
SHORTNESS OF BREATH: 0
VOMITING: 0
SORE THROAT: 0
COUGH: 0
RHINORRHEA: 0
EYES NEGATIVE: 1
DIARRHEA: 0
NAUSEA: 0

## 2021-01-03 NOTE — PROGRESS NOTES
[address] [place] [facility] [address]  Dept: [phone]  Dept Fax: [phone]  Loc: [phone]  PROGRESS NOTE      VisitDate: 2020    Kike Phillips is a 38 y.o. female who presents today for:     Chief Complaint   Patient presents with    Check-Up     DM, Depression. Due for DM foot and eye exam    Labs Only     will get labs done from Oct.     Medication Refill    Flu Vaccine         Subjective:  HPI  Follow-up hypertension hyperlipidemia and diabetes. Blood sugars uncontrolled not following diet. Some issues with taking medication appropriately. A lot of stress recently. Labs reviewed blood pressure readings reviewed patient    Review of Systems   Constitutional: Negative for activity change, appetite change, fatigue and fever. HENT: Negative for congestion, rhinorrhea and sore throat. Eyes: Negative. Respiratory: Negative for cough, chest tightness and shortness of breath. Cardiovascular: Negative for chest pain and palpitations. Gastrointestinal: Negative for abdominal pain, diarrhea, nausea and vomiting. Genitourinary: Negative for dysuria and urgency. Musculoskeletal: Negative for arthralgias and back pain. Neurological: Negative for dizziness and headaches. Psychiatric/Behavioral: Negative for dysphoric mood. The patient is not nervous/anxious. Past Medical History:   Diagnosis Date    Diabetes mellitus ([address]     Hypertension       History reviewed. No pertinent surgical history.   Family History   Problem Relation Age of Onset    Diabetes Mother     Depression Mother     High Blood Pressure Father     Other Father     High Blood Pressure Paternal Grandmother      Social History     Tobacco Use    Smoking status: Former Smoker     Years: 2.00     Types: Cigarettes     Quit date: 10/1/2016     Years since quittin.2    Smokeless tobacco: Never Used  Tobacco comment: 2-3 cigarettes per day   Substance Use Topics    Alcohol use: Yes     Comment: social      Current Outpatient Medications   Medication Sig Dispense Refill    escitalopram (LEXAPRO) 20 MG tablet Take 1 tablet by mouth daily 90 tablet 3    losartan (COZAAR) 100 MG tablet TAKE 1 TABLET BY MOUTH EVERY DAY WITH HYDROCHLOROTHIAZIDE 90 tablet 1    glipiZIDE (GLUCOTROL XL) 5 MG extended release tablet TAKE 1 TABLET BY MOUTH EVERY DAY WITH BREAKFAST 90 tablet 3    hydroCHLOROthiazide (HYDRODIURIL) 25 MG tablet TAKE 1 TABLET BY MOUTH EVERY DAY WITH LOSARTAN 90 tablet 3    metFORMIN (GLUCOPHAGE-XR) 500 MG extended release tablet TAKE 2 TABLETS BY MOUTH TWICE DAILY 120 tablet 3    alogliptin (NESINA) 25 MG TABS tablet TAKE 1 TABLET BY MOUTH EVERY DAY 90 tablet 0    nystatin (MYCOSTATIN) 621987 UNIT/GM cream Apply topically 4 times daily. 60 g 1     No current facility-administered medications for this visit. Allergies   Allergen Reactions    Fluconazole Rash     Health Maintenance   Topic Date Due    Hepatitis C screen  1983    Varicella vaccine (1 of 2 - 2-dose childhood series) 06/04/1984    Pneumococcal 0-64 years Vaccine (1 of 1 - PPSV23) 06/04/1989    Diabetic foot exam  06/04/1993    Diabetic retinal exam  06/04/1993    DTaP/Tdap/Td vaccine (5 - Tdap) 06/04/1994    HIV screen  06/04/1998    Hepatitis B vaccine (1 of 3 - Risk 3-dose series) 06/04/2002    Cervical cancer screen  06/04/2004    A1C test (Diabetic or Prediabetic)  07/16/2020    Diabetic microalbuminuria test  07/16/2020    Lipid screen  07/16/2020    Potassium monitoring  07/16/2020    Creatinine monitoring  07/16/2020    Flu vaccine  Completed    Hepatitis A vaccine  Aged Out    Hib vaccine  Aged Out    Meningococcal (ACWY) vaccine  Aged Out         Objective:     Physical Exam  Constitutional:       General: She is not in acute distress. Appearance: She is well-developed. She is not diaphoretic. HENT:      Head: Normocephalic and atraumatic. Eyes:      General: No scleral icterus. Conjunctiva/sclera: Conjunctivae normal.   Neck:      Thyroid: No thyromegaly. Vascular: No JVD. Comments: No bruits  Cardiovascular:      Rate and Rhythm: Normal rate and regular rhythm. Heart sounds: Normal heart sounds. Pulmonary:      Effort: Pulmonary effort is normal. No respiratory distress. Breath sounds: Normal breath sounds. No wheezing or rales. Abdominal:      Palpations: Abdomen is soft. There is no mass. Tenderness: There is no abdominal tenderness. There is no guarding. Musculoskeletal:         General: No tenderness. Skin:     General: Skin is warm and dry. Findings: No rash. Neurological:      Mental Status: She is alert and oriented to person, place, and time. Cranial Nerves: No cranial nerve deficit. Comments: Sensory exam of the foot is normal, tested with the monofilament. Good pulses, no lesions or ulcers, good peripheral pulses. BP (!) 144/90 (Site: Left Upper Arm)   Pulse 88   Temp 98.1 °F (36.7 °C) (Oral)   Resp 20   Ht 5' 4.5\" (1.638 m)   Wt 275 lb (124.7 kg)   LMP 12/09/2020 (Approximate)   BMI 46.47 kg/m²       Impression/Plan:  1. Uncontrolled type 2 diabetes mellitus with hyperglycemia ([facility]    2. Hyperlipidemia, unspecified hyperlipidemia type    3.  Essential hypertension      Requested Prescriptions     Signed Prescriptions Disp Refills    escitalopram (LEXAPRO) 20 MG tablet 90 tablet 3     Sig: Take 1 tablet by mouth daily    losartan (COZAAR) 100 MG tablet 90 tablet 1     Sig: TAKE 1 TABLET BY MOUTH EVERY DAY WITH HYDROCHLOROTHIAZIDE     Orders Placed This Encounter   Procedures    INFLUENZA, QUADV, 3 YRS AND OLDER, IM PF, PREFILL SYR OR SDV, 0.5ML (AFLURIA QUADV, PF)

## 2021-01-06 ENCOUNTER — VIRTUAL VISIT (OUTPATIENT)
Dept: FAMILY MEDICINE CLINIC | Age: 36
End: 2021-01-06
Payer: COMMERCIAL

## 2021-01-06 ENCOUNTER — HOSPITAL ENCOUNTER (OUTPATIENT)
Age: 36
Discharge: HOME | End: 2021-01-06
Payer: COMMERCIAL

## 2021-01-06 DIAGNOSIS — Z20.822 SUSPECTED COVID-19 VIRUS INFECTION: Primary | ICD-10-CM

## 2021-01-06 DIAGNOSIS — Z20.822 SUSPECTED COVID-19 VIRUS INFECTION: ICD-10-CM

## 2021-01-06 PROCEDURE — 99213 OFFICE O/P EST LOW 20 MIN: CPT | Performed by: FAMILY MEDICINE

## 2021-01-06 PROCEDURE — U0003 INFECTIOUS AGENT DETECTION BY NUCLEIC ACID (DNA OR RNA); SEVERE ACUTE RESPIRATORY SYNDROME CORONAVIRUS 2 (SARS-COV-2) (CORONAVIRUS DISEASE [COVID-19]), AMPLIFIED PROBE TECHNIQUE, MAKING USE OF HIGH THROUGHPUT TECHNOLOGIES AS DESCRIBED BY CMS-2020-01-R: HCPCS

## 2021-01-06 ASSESSMENT — ENCOUNTER SYMPTOMS
BACK PAIN: 0
EYES NEGATIVE: 1
DIARRHEA: 0
COUGH: 1
SHORTNESS OF BREATH: 0
RHINORRHEA: 1
ABDOMINAL PAIN: 0
VOMITING: 0
NAUSEA: 0
CHEST TIGHTNESS: 0
SORE THROAT: 0

## 2021-01-06 NOTE — PROGRESS NOTES
2021    TELEHEALTH EVALUATION -- Audio/Visual (During MLOSK-79 public health emergency)    HPI:    Ian Dunne (:  1985) has requested an audio/video evaluation for the following concern(s):    Exp to covid, has congestion, cough, and loss of smell/taste. Review of Systems   Constitutional: Negative for activity change, appetite change, fatigue and fever. HENT: Positive for congestion and rhinorrhea. Negative for sore throat. Eyes: Negative. Respiratory: Positive for cough. Negative for chest tightness and shortness of breath. Cardiovascular: Negative for chest pain and palpitations. Gastrointestinal: Negative for abdominal pain, diarrhea, nausea and vomiting. Genitourinary: Negative for dysuria and urgency. Musculoskeletal: Negative for arthralgias and back pain. Neurological: Negative for dizziness and headaches. Psychiatric/Behavioral: Negative for dysphoric mood. The patient is not nervous/anxious. Prior to Visit Medications    Medication Sig Taking? Authorizing Provider   escitalopram (LEXAPRO) 20 MG tablet Take 1 tablet by mouth daily  Minna Richardson MD   losartan (COZAAR) 100 MG tablet TAKE 1 TABLET BY MOUTH EVERY DAY WITH HYDROCHLOROTHIAZIDE  Minna Richardson MD   glipiZIDE (GLUCOTROL XL) 5 MG extended release tablet TAKE 1 TABLET BY MOUTH EVERY DAY WITH BREAKFAST  SAURABH Franklin CNP   hydroCHLOROthiazide (HYDRODIURIL) 25 MG tablet TAKE 1 TABLET BY MOUTH EVERY DAY WITH LOSARTAN  SAURABH Franklin CNP   metFORMIN (GLUCOPHAGE-XR) 500 MG extended release tablet TAKE 2 TABLETS BY MOUTH TWICE DAILY  Minna Richardson MD   alogliptin (NESINA) 25 MG TABS tablet TAKE 1 TABLET BY MOUTH EVERY DAY  Minna Richardson MD   nystatin (MYCOSTATIN) 960484 UNIT/GM cream Apply topically 4 times daily.   Minna Richardson MD       Social History     Tobacco Use    Smoking status: Former Smoker     Years: 2.00     Types: Cigarettes     Quit date: 10/1/2016 Years since quittin.2    Smokeless tobacco: Never Used    Tobacco comment: 2-3 cigarettes per day   Substance Use Topics    Alcohol use: Yes     Comment: social    Drug use: No        Allergies   Allergen Reactions    Fluconazole Rash   ,   Past Medical History:   Diagnosis Date    Diabetes mellitus ([address]     Hypertension    , History reviewed. No pertinent surgical history. ,   Social History     Tobacco Use    Smoking status: Former Smoker     Years: 2.00     Types: Cigarettes     Quit date: 10/1/2016     Years since quittin.2    Smokeless tobacco: Never Used    Tobacco comment: 2-3 cigarettes per day   Substance Use Topics    Alcohol use: Yes     Comment: social    Drug use: No   ,   Family History   Problem Relation Age of Onset    Diabetes Mother     Depression Mother     High Blood Pressure Father     Other Father     High Blood Pressure Paternal Grandmother    ,   Immunization History   Administered Date(s) Administered    DTP 1984, 1986, 1986, 1987    Influenza, Azra Kalata, IM, PF (6 mo and older Fluzone, Flulaval, Fluarix, and 3 yrs and older Afluria) 10/16/2017, 2020    MMR 1986    Polio OPV 1984, 1986, 1987    Td vaccine (adult) 2004   ,   Health Maintenance   Topic Date Due    Hepatitis C screen  1983    Varicella vaccine (1 of 2 - 2-dose childhood series) 1984    Pneumococcal 0-64 years Vaccine (1 of 1 - PPSV23) 1989    Diabetic foot exam  1993    Diabetic retinal exam  1993    DTaP/Tdap/Td vaccine (5 - Tdap) 1994    HIV screen  1998    Hepatitis B vaccine (1 of 3 - Risk 3-dose series) 2002    Cervical cancer screen  2004    A1C test (Diabetic or Prediabetic)  2020    Diabetic microalbuminuria test  2020    Lipid screen  2020    Potassium monitoring  2020    Creatinine monitoring  2020    Flu vaccine  Completed Saline media preferred given current shortage of viral transport media but both acceptable     Order Specific Question:   Is this test for diagnosis or screening? Answer:   Diagnosis of ill patient     Order Specific Question:   Symptomatic for COVID-19 as defined by CDC? Answer:   Yes     Order Specific Question:   Date of Symptom Onset     Answer:   1/2/2021     Order Specific Question:   Hospitalized for COVID-19? Answer:   No     Order Specific Question:   Admitted to ICU for COVID-19? Answer:   No     Order Specific Question:   Employed in healthcare setting? Answer:   No     Order Specific Question:   Resident in a congregate (group) care setting? Answer:   No     Order Specific Question:   Pregnant? Answer:   No     Order Specific Question:   Previously tested for COVID-19? Answer:   Yes         No follow-ups on file. Meri Miller is a 38 y.o. female being evaluated by a Virtual Visit (video visit) encounter to address concerns as mentioned above. A caregiver was present when appropriate. Due to this being a TeleHealth encounter (During IBJKB-05 public health emergency), evaluation of the following organ systems was limited: Vitals/Constitutional/EENT/Resp/CV/GI//MS/Neuro/Skin/Heme-Lymph-Imm. Pursuant to the emergency declaration under the [facility], [address] [place] [address] authority and the [employer] Act, this Virtual Visit was conducted with patient's (and/or legal guardian's) consent, to reduce the patient's risk of exposure to COVID-19 and provide necessary medical care. The patient (and/or legal guardian) has also been advised to contact this office for worsening conditions or problems, and seek emergency medical treatment and/or call 911 if deemed necessary.      Patient identification was verified at the start of the visit: Yes    Total time spent on this encounter: 20m Services were provided through a video synchronous discussion virtually to substitute for in-person clinic visit. Patient and provider were located at their individual homes. --Joseluis Ordoñez MD on 1/6/2021 at 1:44 PM    An electronic signature was used to authenticate this note.

## 2021-01-08 LAB — SARS-COV-2: DETECTED

## 2021-01-13 ENCOUNTER — TELEPHONE (OUTPATIENT)
Dept: FAMILY MEDICINE CLINIC | Age: 36
End: 2021-01-13

## 2021-01-13 NOTE — TELEPHONE ENCOUNTER
Patient tested positive for covid Still c/o nasal congestion, drainage, ST, low-grade fever, aches. She is taking Tylenol Cold/Flu otc. I did explain to her that it is not unusual to continue with sx for at least 10-14 days. Wants to know if she should be treated with ATB.

## 2021-01-14 ENCOUNTER — TELEPHONE (OUTPATIENT)
Dept: FAMILY MEDICINE CLINIC | Age: 36
End: 2021-01-14

## 2021-01-14 NOTE — LETTER
[address]  [address]  [place] [address] [address]  Phone: [phone]  Fax: [phone]    Wade Gillespie MD        January 14, 2021     Patient: Reggie Hayes   YOB: 1985           To Whom it May Concern:    Please excuse Nida Narvaez from work 1/14/21 and 1/15/21 due to Covid, she may return to work 1/18/21. If you have any questions or concerns, please don't hesitate to call.     Sincerely,         Wade Gillespie MD

## 2021-01-14 NOTE — TELEPHONE ENCOUNTER
Patient requesting off-work note for today and tomorrow, to return on 1/18 due to covid. [id number] [address]?    email to: [email or web address]

## 2021-01-21 RX ORDER — ALOGLIPTIN 25 MG/1
TABLET, FILM COATED ORAL
Qty: 90 TABLET | Refills: 0 | Status: SHIPPED | OUTPATIENT
Start: 2021-01-21 | End: 2021-04-30

## 2021-03-15 ENCOUNTER — TELEPHONE (OUTPATIENT)
Dept: FAMILY MEDICINE CLINIC | Age: 36
End: 2021-03-15

## 2021-03-15 RX ORDER — FAMCICLOVIR 500 MG/1
TABLET, FILM COATED ORAL
Qty: 3 TABLET | Refills: 0 | Status: SHIPPED | OUTPATIENT
Start: 2021-03-15 | End: 2021-11-02 | Stop reason: ALTCHOICE

## 2021-03-15 NOTE — TELEPHONE ENCOUNTER
Has had 3 cold sores over the past 4 weeks, asking if there is something other than Acyclovir Oint that she can use?      SORENC  CPB

## 2021-03-25 RX ORDER — METFORMIN HYDROCHLORIDE 500 MG/1
TABLET, EXTENDED RELEASE ORAL
Qty: 120 TABLET | Refills: 3 | Status: SHIPPED | OUTPATIENT
Start: 2021-03-25 | End: 2021-08-02

## 2021-04-19 ENCOUNTER — TELEPHONE (OUTPATIENT)
Dept: FAMILY MEDICINE CLINIC | Age: 36
End: 2021-04-19

## 2021-04-19 NOTE — TELEPHONE ENCOUNTER
Consultation - [facility]  Juan Robin 77 y o  male MRN: [id number]  Unit/Bed#: [facility] 807-01 Encounter: [id number]             Inpatient consult to gastroenterology     Performed by  Nelson Christianson     Authorized by Chiquita Stephenson              Reason for Consult / Principal Problem:     Liver transplant      ASSESSMENT AND PLAN:      Liver Transplant  Patient status post liver transplant 2002 [address] transplant he has been managed by Dr Lisandro Brown  Currently on tacrolimus b i d  P o  , most recent level was obtained yesterday and was normal  Patient has been tolerating the p o  route ever since admission and only missed 1 dose  Had mild elevation in alk-phos on admission but currently is trending down normal T bili, AST and ALT  Will continue with tacrolimus b i d  Follow-up with Dr Leonard Later as outpatient    ______________________________________________________________________    HPI:    59-year-old male patient with past medical history significant for CVA, CKD status post liver transplant 2002, ever since liver transplant the patient has been on tacrolimus managed by Dr Lisandro Brown  Patient was admitted to the hospital after presenting with encephalopathy suspected to be multifactorial from sepsis and chronic kidney disease, during admission he was nonverbal, was started on empirical antibiotics, currently he is awake alert and oriented x3, denies any events overnight, currently is NPO, denies nausea or vomiting, is passing gases but no bowel movements, denies chest pain or shortness of breath, no abdominal pain    REVIEW OF SYSTEMS:    CONSTITUTIONAL: Denies any fever, chills, rigors, and weight loss  HEENT: No earache or tinnitus  Denies hearing loss or visual disturbances  CARDIOVASCULAR: No chest pain or palpitations  RESPIRATORY: Denies any cough, hemoptysis, shortness of breath or dyspnea on exertion    GASTROINTESTINAL: As noted in the History of Cont acyclovir ointment, if worsening redness, notify office. Present Illness  GENITOURINARY: No problems with urination  Denies any hematuria or dysuria  NEUROLOGIC: No dizziness or vertigo, denies headaches  MUSCULOSKELETAL: Denies any muscle or joint pain  SKIN: Denies skin rashes or itching  ENDOCRINE: Denies excessive thirst  Denies intolerance to heat or cold  PSYCHOSOCIAL: Denies depression or anxiety  Denies any recent memory loss         Historical Information   Past Medical History:   Diagnosis Date    Alcoholism ([place] [address] )     Cerebral artery aneurysm     Change in mental state     last assessed 5/18/15; resolved 10/27/15    Diabetes mellitus ([place] [address] )     Drug dependence ([place] [address] )     Fatigue     last assessed 1/26/15; resolved 5/24/16    Hepatitis C     Kidney disease     Laennec's cirrhosis (alcoholic) ([place] [address] )     Liver transplant recipient 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    CATARACT EXTRACTION      CHOLECYSTECTOMY      LIVER TRANSPLANTATION      ROTATOR CUFF REPAIR      SHOULDER SURGERY       Social History   History   Alcohol Use No     History   Drug Use No     Comment: remotely quit drug use per allscripts      History   Smoking Status    Never Smoker   Smokeless Tobacco    Never Used     Comment: former smoker per allscripts      Family History   Problem Relation Age of Onset    Hypertension Mother         benign essential     Lung cancer Father     Diabetes Sister     Cancer Brother     Stroke Other         cva - due to embolism of cerebra artery        Meds/Allergies     Prescriptions Prior to Admission   Medication    aspirin 81 mg chewable tablet    atorvastatin (LIPITOR) 40 mg tablet    GREER CONTOUR TEST test strip    Cetirizine HCl (ZYRTEC ALLERGY) 10 MG CAPS    clopidogrel (PLAVIX) 75 mg tablet    INSULIN SYRINGE 1CC/29G 29G X 1/2" 1 ML MISC    Lancets MISC    LANTUS 100 UNIT/ML subcutaneous injection    LYRICA 50 MG capsule    nystatin (MYCOSTATIN) cream    nystatin-triamcinolone (MYCOLOG-II) cream    pregabalin (LYRICA) 25 mg capsule    rosuvastatin (CRESTOR) 10 MG tablet    tacrolimus (PROGRAF) 1 mg capsule    temazepam (RESTORIL) 30 mg capsule    traMADol (ULTRAM) 50 mg tablet    triamcinolone (KENALOG) 0 1 % cream    zolpidem (AMBIEN) 10 mg tablet     Current Facility-Administered Medications   Medication Dose Route Frequency    acetaminophen (TYLENOL) tablet 650 mg  650 mg Oral Q6H PRN    acyclovir (ZOVIRAX) 600 mg in sodium chloride 0 9 % 100 mL IVPB  10 mg/kg (Ideal) Intravenous Q12H    ampicillin (OMNIPEN) 2,000 mg in sodium chloride 0 9 % 100 mL IVPB  2,000 mg Intravenous Q6H    cefTRIAXone (ROCEPHIN) 2,000 mg in dextrose 5 % 50 mL IVPB  2,000 mg Intravenous Q12H    heparin (porcine) subcutaneous injection 5,000 Units  5,000 Units Subcutaneous Q8H [facility]    insulin glargine (LANTUS) subcutaneous injection 8 Units 0 08 mL  8 Units Subcutaneous HS    multi-electrolyte (ISOLYTE-S PH 7 4 equivalent) IV solution  100 mL/hr Intravenous Continuous    OLANZapine (ZyPREXA) IM injection 10 mg  10 mg Intramuscular Q8H PRN    tacrolimus (PROGRAF) capsule 1 mg  1 mg Oral Q12H [facility]    vancomycin (VANCOCIN) 1,250 mg in sodium chloride 0 9 % 250 mL IVPB  20 mg/kg Intravenous Q24H       Allergies   Allergen Reactions    Ciprofloxacin     Iv Contrast [Iodinated Diagnostic Agents]     Levaquin [Levofloxacin]     Omnipaque [Iohexol]            Objective     Blood pressure 136/76, pulse 69, temperature 98 4 °F (36 9 °C), temperature source Oral, resp  rate 18, height 5' 4" (1 626 m), weight 57 9 kg (127 lb 11 2 oz), SpO2 95 %  Body mass index is 21 92 kg/m²        Intake/Output Summary (Last 24 hours) at 12/13/18 3246  Last data filed at 12/13/18 1329   Gross per 24 hour   Intake              325 ml   Output                0 ml   Net              325 ml         PHYSICAL EXAM:      General Appearance:   Alert, cooperative, no distress   HEENT:   Normocephalic, atraumatic, anicteric      Neck:  Supple, symmetrical, trachea midline   Lungs:   Clear to auscultation bilaterally; no rales, rhonchi or wheezing; respirations unlabored    Heart[de-identified]   Regular rate and rhythm; no murmur, rub, or gallop     Abdomen:   Soft, non-tender, non-distended; normal bowel sounds; no masses, no organomegaly    Genitalia:   Deferred    Rectal:   Deferred    Extremities:  No cyanosis, clubbing or edema    Pulses:  2+ and symmetric all extremities    Skin:  No jaundice, rashes, or lesions    Lymph nodes:  No palpable cervical lymphadenopathy        Lab Results:   Admission on 12/11/2018   Component Date Value    POC Glucose 12/11/2018 407*    Sodium 12/11/2018 126*    Potassium 12/11/2018 4 3     Chloride 12/11/2018 94*    CO2 12/11/2018 22     ANION GAP 12/11/2018 10     BUN 12/11/2018 24     Creatinine 12/11/2018 2 00*    Glucose 12/11/2018 321*    Calcium 12/11/2018 9 8     AST 12/11/2018 34     ALT 12/11/2018 29     Alkaline Phosphatase 12/11/2018 155*    Total Protein 12/11/2018 8 6*    Albumin 12/11/2018 3 6     Total Bilirubin 12/11/2018 0 52     eGFR 12/11/2018 33     Ammonia 12/11/2018 15     LACTIC ACID 12/11/2018 5 4*    WBC 12/11/2018 10 44*    RBC 12/11/2018 5 04     Hemoglobin 12/11/2018 12 2     Hematocrit 12/11/2018 38 9     MCV 12/11/2018 77*    MCH 12/11/2018 24 2*    MCHC 12/11/2018 31 4     RDW 12/11/2018 14 3     MPV 12/11/2018 13 3*    Platelets 97/55/8049 124*    nRBC 12/11/2018 0     Neutrophils Relative 12/11/2018 80*    Immat GRANS % 12/11/2018 0     Lymphocytes Relative 12/11/2018 14     Monocytes Relative 12/11/2018 6     Eosinophils Relative 12/11/2018 0     Basophils Relative 12/11/2018 0     Neutrophils Absolute 12/11/2018 8 36*  Immature Grans Absolute 12/11/2018 0 04     Lymphocytes Absolute 12/11/2018 1 43     Monocytes Absolute 12/11/2018 0 57     Eosinophils Absolute 12/11/2018 0 01     Basophils Absolute 12/11/2018 0 03     TSH 3RD GENERATON 12/11/2018 1 990     Color, UA 12/11/2018 see results     Blood Culture 12/11/2018 No Growth at 24 hrs       Lipase 12/11/2018 97     Troponin I 12/11/2018 <0 02     Amph/Meth UR 12/11/2018 Negative     Barbiturate Ur 12/11/2018 Negative     Benzodiazepine Urine 12/11/2018 Negative     Cocaine Urine 12/11/2018 Negative     Methadone Urine 12/11/2018 Negative     Opiate Urine 12/11/2018 Negative     PCP Ur 12/11/2018 Negative     THC Urine 12/11/2018 Negative     Carbon Monoxide, Blood 12/11/2018 1 6*    POC Glucose 12/11/2018 300*    Color, UA 12/11/2018 Yellow     Clarity, UA 12/11/2018 Clear     pH, UA 12/11/2018 8 5*    Leukocytes, UA 12/11/2018 Negative     Nitrite, UA 12/11/2018 Negative     Protein, UA 12/11/2018 100 (2+)*    Glucose, UA 12/11/2018 500 (1/2%)*    Ketones, UA 12/11/2018 Negative     Urobilinogen, UA 12/11/2018 0 2     Bilirubin, UA 12/11/2018 Negative     Blood, UA 12/11/2018 Trace*    Specific Gravity, UA 12/11/2018 1 020     RBC, UA 12/11/2018 2-4*    WBC, UA 12/11/2018 None Seen     Epithelial Cells 12/11/2018 None Seen     Bacteria, UA 12/11/2018 None Seen     Hyaline Casts, UA 12/11/2018 None Seen     Ethanol Lvl 30/43/3050 <3     Salicylate Lvl 28/59/0218 <3*    Acetaminophen Level 12/11/2018 <2*    LACTIC ACID 12/11/2018 2 8*    Protime 12/11/2018 15 2*    INR 12/11/2018 1 19*    Platelets 38/38/2653 122*    MPV 12/11/2018 12 5     Procalcitonin 12/11/2018 0 15     LACTIC ACID 12/11/2018 2 3*    POC Glucose 12/11/2018 172*    Ventricular Rate 12/11/2018 94     Atrial Rate 12/11/2018 300     QRSD Interval 12/11/2018 74     QT Interval 12/11/2018 320     QTC Interval 12/11/2018 400     QRS Axis 12/11/2018 11     T Wave Axis 12/11/2018 48     POC Glucose 12/12/2018 130     Sodium 12/12/2018 137     Potassium 12/12/2018 3 9     Chloride 12/12/2018 103     CO2 12/12/2018 25     ANION GAP 12/12/2018 9     BUN 12/12/2018 22     Creatinine 12/12/2018 1 51*    Glucose 12/12/2018 71     Calcium 12/12/2018 7 7*    AST 12/12/2018 42     ALT 12/12/2018 27     Alkaline Phosphatase 12/12/2018 122*    Total Protein 12/12/2018 7 6     Albumin 12/12/2018 3 2*    Total Bilirubin 12/12/2018 0 48     eGFR 12/12/2018 46     WBC 12/12/2018 13 38*    RBC 12/12/2018 4 32     Hemoglobin 12/12/2018 10 5*    Hematocrit 12/12/2018 33 3*    MCV 12/12/2018 77*    MCH 12/12/2018 24 3*    MCHC 12/12/2018 31 5     RDW 12/12/2018 14 4     MPV 12/12/2018 13 1*    Platelets 32/91/7375 113*    nRBC 12/12/2018 0     Neutrophils Relative 12/12/2018 77*    Immat GRANS % 12/12/2018 0     Lymphocytes Relative 12/12/2018 14     Monocytes Relative 12/12/2018 9     Eosinophils Relative 12/12/2018 0     Basophils Relative 12/12/2018 0     Neutrophils Absolute 12/12/2018 10 28*    Immature Grans Absolute 12/12/2018 0 04     Lymphocytes Absolute 12/12/2018 1 84     Monocytes Absolute 12/12/2018 1 19     Eosinophils Absolute 12/12/2018 0 01     Basophils Absolute 12/12/2018 0 02     Vancomycin Rm 12/12/2018 16 0     POC Glucose 12/12/2018 67     POC Glucose 12/12/2018 132     Ammonia 12/12/2018 11     Crypto Ag 12/12/2018 Negative     INFLU A PCR 12/12/2018 None Detected     INFLU B PCR 12/12/2018 None Detected     RSV PCR 12/12/2018 None Detected     Blood Culture 12/12/2018 No Growth at 24 hrs   Blood Culture 12/12/2018 No Growth at 24 hrs       POC Glucose 12/12/2018 63*    POC Glucose 12/12/2018 64*    TACROLIMUS 12/12/2018 3 7     POC Glucose 12/13/2018 77     LACTIC ACID 12/13/2018 1 1     WBC 12/13/2018 12 29*    RBC 12/13/2018 4 48     Hemoglobin 12/13/2018 10 7*    Hematocrit 12/13/2018 34 5*    MCV 12/13/2018 77*    MCH 12/13/2018 23 9*    MCHC 12/13/2018 31 0*    RDW 12/13/2018 14 4     MPV 12/13/2018 12 4     Platelets 09/93/2589 131*    nRBC 12/13/2018 0     Neutrophils Relative 12/13/2018 74     Immat GRANS % 12/13/2018 0     Lymphocytes Relative 12/13/2018 19     Monocytes Relative 12/13/2018 7     Eosinophils Relative 12/13/2018 0     Basophils Relative 12/13/2018 0     Neutrophils Absolute 12/13/2018 9 02*    Immature Grans Absolute 12/13/2018 0 03     Lymphocytes Absolute 12/13/2018 2 30     Monocytes Absolute 12/13/2018 0 89     Eosinophils Absolute 12/13/2018 0 02     Basophils Absolute 12/13/2018 0 03     Sodium 12/13/2018 135*    Potassium 12/13/2018 3 1*    Chloride 12/13/2018 103     CO2 12/13/2018 22     ANION GAP 12/13/2018 10     BUN 12/13/2018 20     Creatinine 12/13/2018 1 51*    Glucose 12/13/2018 72     Calcium 12/13/2018 8 6     eGFR 12/13/2018 46     Magnesium 12/13/2018 1 5*    Phosphorus 12/13/2018 3 0     Procalcitonin 12/13/2018 0 22     POC Glucose 12/13/2018 62*    POC Glucose 12/13/2018 127        Imaging Studies: I have personally reviewed pertinent imaging studies

## 2021-04-19 NOTE — TELEPHONE ENCOUNTER
She is still breaking out in cold sores. She did the famvir 500 mg  3 tabs QD and is using the Ointment. She is asking if there is anything else she can do?      Joe  CPB

## 2021-04-30 RX ORDER — ALOGLIPTIN 25 MG/1
TABLET, FILM COATED ORAL
Qty: 90 TABLET | Refills: 0 | Status: SHIPPED | OUTPATIENT
Start: 2021-04-30 | End: 2021-08-02

## 2021-04-30 NOTE — LETTER
[address] [place] [facility] [address]  Dept: [phone]  Loc: [address] Trisha Piña MD        4/30/2021    [address] [place] [address]  [facility] [address].[facility] [address]      Dear Karla Alarcon: This letter is a reminder that you may have diagnostic testing that has not been completed. It is important to your well-being that these test(s) are performed. Please call our office at Dept: [phone] for additional information on the outstanding tests or let us know if they have been completed so we may update your chart. We cannot refill your medications until these labs are completed.      Sincerely,        Morris Carlos MD

## 2021-04-30 NOTE — TELEPHONE ENCOUNTER
----- Message from Jorge Alberto Santiago sent at 4/30/2021 12:37 PM EDT -----  Subject: Message to Provider    QUESTIONS  Information for Provider? patient called to have her prescription for   Nystatin cream refilled as was last filled in August 2019. Please refill   to [address] [place] [address] Phone? [phone]  Fax? [phone].   ---------------------------------------------------------------------------  --------------  Coretta MARROQUIN  What is the best way for the office to contact you? OK to leave message on   voicemail  Preferred Call Back Phone Number? [phone]  ---------------------------------------------------------------------------  --------------  SCRIPT ANSWERS  Relationship to Patient?  Self

## 2021-05-03 RX ORDER — NYSTATIN 100000 U/G
CREAM TOPICAL
Qty: 60 G | Refills: 1 | Status: SHIPPED | OUTPATIENT
Start: 2021-05-03 | End: 2021-11-02 | Stop reason: ALTCHOICE

## 2021-08-02 RX ORDER — ALOGLIPTIN 25 MG/1
TABLET, FILM COATED ORAL
Qty: 90 TABLET | Refills: 0 | Status: SHIPPED | OUTPATIENT
Start: 2021-08-02 | End: 2021-11-18 | Stop reason: SDUPTHER

## 2021-08-02 RX ORDER — METFORMIN HYDROCHLORIDE 500 MG/1
TABLET, EXTENDED RELEASE ORAL
Qty: 120 TABLET | Refills: 3 | Status: SHIPPED | OUTPATIENT
Start: 2021-08-02 | End: 2021-12-08

## 2021-08-16 ENCOUNTER — OFFICE VISIT (OUTPATIENT)
Dept: FAMILY MEDICINE CLINIC | Age: 36
End: 2021-08-16
Payer: COMMERCIAL

## 2021-08-16 VITALS
RESPIRATION RATE: 16 BRPM | TEMPERATURE: 98.5 F | WEIGHT: 273 LBS | DIASTOLIC BLOOD PRESSURE: 88 MMHG | SYSTOLIC BLOOD PRESSURE: 138 MMHG | HEART RATE: 82 BPM | BODY MASS INDEX: 46.14 KG/M2

## 2021-08-16 DIAGNOSIS — J03.90 ACUTE TONSILLITIS, UNSPECIFIED ETIOLOGY: ICD-10-CM

## 2021-08-16 DIAGNOSIS — E11.65 UNCONTROLLED TYPE 2 DIABETES MELLITUS WITH HYPERGLYCEMIA (HCC): ICD-10-CM

## 2021-08-16 DIAGNOSIS — Z00.00 ANNUAL PHYSICAL EXAM: Primary | ICD-10-CM

## 2021-08-16 LAB — HBA1C MFR BLD: 8.9 %

## 2021-08-16 PROCEDURE — 83036 HEMOGLOBIN GLYCOSYLATED A1C: CPT | Performed by: NURSE PRACTITIONER

## 2021-08-16 PROCEDURE — 99395 PREV VISIT EST AGE 18-39: CPT | Performed by: NURSE PRACTITIONER

## 2021-08-16 RX ORDER — DOXYCYCLINE HYCLATE 100 MG
100 TABLET ORAL 2 TIMES DAILY
Qty: 20 TABLET | Refills: 0 | Status: SHIPPED | OUTPATIENT
Start: 2021-08-16 | End: 2021-08-26

## 2021-08-16 RX ORDER — ORAL SEMAGLUTIDE 7 MG/1
7 TABLET ORAL DAILY
Qty: 90 TABLET | Refills: 3 | Status: SHIPPED | OUTPATIENT
Start: 2021-08-16 | End: 2021-11-02 | Stop reason: ALTCHOICE

## 2021-08-16 SDOH — ECONOMIC STABILITY: FOOD INSECURITY: WITHIN THE PAST 12 MONTHS, YOU WORRIED THAT YOUR FOOD WOULD RUN OUT BEFORE YOU GOT MONEY TO BUY MORE.: NEVER TRUE

## 2021-08-16 SDOH — ECONOMIC STABILITY: FOOD INSECURITY: WITHIN THE PAST 12 MONTHS, THE FOOD YOU BOUGHT JUST DIDN'T LAST AND YOU DIDN'T HAVE MONEY TO GET MORE.: NEVER TRUE

## 2021-08-16 ASSESSMENT — PATIENT HEALTH QUESTIONNAIRE - PHQ9
SUM OF ALL RESPONSES TO PHQ QUESTIONS 1-9: 13
10. IF YOU CHECKED OFF ANY PROBLEMS, HOW DIFFICULT HAVE THESE PROBLEMS MADE IT FOR YOU TO DO YOUR WORK, TAKE CARE OF THINGS AT HOME, OR GET ALONG WITH OTHER PEOPLE: 1
2. FEELING DOWN, DEPRESSED OR HOPELESS: 2
SUM OF ALL RESPONSES TO PHQ QUESTIONS 1-9: 14
8. MOVING OR SPEAKING SO SLOWLY THAT OTHER PEOPLE COULD HAVE NOTICED. OR THE OPPOSITE, BEING SO FIGETY OR RESTLESS THAT YOU HAVE BEEN MOVING AROUND A LOT MORE THAN USUAL: 1
3. TROUBLE FALLING OR STAYING ASLEEP: 3
6. FEELING BAD ABOUT YOURSELF - OR THAT YOU ARE A FAILURE OR HAVE LET YOURSELF OR YOUR FAMILY DOWN: 1
9. THOUGHTS THAT YOU WOULD BE BETTER OFF DEAD, OR OF HURTING YOURSELF: 1
SUM OF ALL RESPONSES TO PHQ QUESTIONS 1-9: 14
4. FEELING TIRED OR HAVING LITTLE ENERGY: 1
1. LITTLE INTEREST OR PLEASURE IN DOING THINGS: 2
SUM OF ALL RESPONSES TO PHQ9 QUESTIONS 1 & 2: 4
5. POOR APPETITE OR OVEREATING: 1
7. TROUBLE CONCENTRATING ON THINGS, SUCH AS READING THE NEWSPAPER OR WATCHING TELEVISION: 2

## 2021-08-16 ASSESSMENT — SOCIAL DETERMINANTS OF HEALTH (SDOH): HOW HARD IS IT FOR YOU TO PAY FOR THE VERY BASICS LIKE FOOD, HOUSING, MEDICAL CARE, AND HEATING?: NOT HARD AT ALL

## 2021-08-16 ASSESSMENT — COLUMBIA-SUICIDE SEVERITY RATING SCALE - C-SSRS
6. HAVE YOU EVER DONE ANYTHING, STARTED TO DO ANYTHING, OR PREPARED TO DO ANYTHING TO END YOUR LIFE?: NO
2. HAVE YOU ACTUALLY HAD ANY THOUGHTS OF KILLING YOURSELF?: NO
1. WITHIN THE PAST MONTH, HAVE YOU WISHED YOU WERE DEAD OR WISHED YOU COULD GO TO SLEEP AND NOT WAKE UP?: YES

## 2021-08-16 ASSESSMENT — ENCOUNTER SYMPTOMS
ABDOMINAL PAIN: 0
ALLERGIC/IMMUNOLOGIC NEGATIVE: 1
TROUBLE SWALLOWING: 1
VOMITING: 0
SHORTNESS OF BREATH: 0
BACK PAIN: 0
WHEEZING: 0
SORE THROAT: 1
DIARRHEA: 1
RHINORRHEA: 0
NAUSEA: 0
EYE DISCHARGE: 0
EYE REDNESS: 0
COUGH: 0
EYE PAIN: 0
CONSTIPATION: 0

## 2021-08-16 NOTE — PROGRESS NOTES
(MYCOSTATIN) 018014 UNIT/GM cream Apply topically 4 times daily. Yes SAURABH Ewing CNP   famciclovir (FAMVIR) 500 MG tablet Take 3 tablets by mouth once Yes Michele Reynoso MD   escitalopram (LEXAPRO) 20 MG tablet Take 1 tablet by mouth daily Yes Michele Reynoso MD   losartan (COZAAR) 100 MG tablet TAKE 1 TABLET BY MOUTH EVERY DAY WITH HYDROCHLOROTHIAZIDE Yes Michele Reynoso MD   hydroCHLOROthiazide (HYDRODIURIL) 25 MG tablet TAKE 1 TABLET BY MOUTH EVERY DAY WITH LOSARTAN Yes SAURABH Steele CNP        Allergies   Allergen Reactions    Fluconazole Rash       Past Medical History:   Diagnosis Date    Diabetes mellitus ([facility] [address]     Hypertension        History reviewed. No pertinent surgical history. Social History     Socioeconomic History    Marital status: Single     Spouse name: Not on file    Number of children: Not on file    Years of education: Not on file    Highest education level: Not on file   Occupational History    Not on file   Tobacco Use    Smoking status: Former Smoker     Years: 2.00     Types: Cigarettes     Quit date: 10/1/2016     Years since quittin.8    Smokeless tobacco: Never Used    Tobacco comment: 2-3 cigarettes per day   Substance and Sexual Activity    Alcohol use: Yes     Comment: social    Drug use: No    Sexual activity: Not on file   Other Topics Concern    Not on file   Social History Narrative    Not on file     Social Determinants of Health     Financial Resource Strain: Low Risk     Difficulty of Paying Living Expenses: Not hard at all   Food Insecurity: No Food Insecurity    Worried About [address] in the Last Year: Never true    [address]  in the Last Year: Never true   Transportation Needs:     Lack of Transportation (Medical):      Lack of Transportation (Non-Medical):    Physical Activity:     Days of Exercise per Week:     Minutes of Exercise per Session:    Stress:     Feeling of Stress :    Social Connections:     Frequency of Communication with Friends and Family:     Frequency of Social Gatherings with Friends and Family:     Attends [employer] Services:     Active Member of Clubs or Organizations:     Attends Club or Organization Meetings:     Marital Status:    Intimate Partner Violence:     Fear of Current or Ex-Partner:     Emotionally Abused:     Physically Abused:     Sexually Abused:         Family History   Problem Relation Age of Onset    Diabetes Mother     Depression Mother     High Blood Pressure Father     Other Father     High Blood Pressure Paternal Grandmother        ADVANCE DIRECTIVE: N, <no information>    Vitals:    08/16/21 1408   BP: 138/88   Pulse: 82   Resp: 16   Temp: 98.5 °F (36.9 °C)   TempSrc: Oral   Weight: 273 lb (123.8 kg)     Estimated body mass index is 46.14 kg/m² as calculated from the following:    Height as of 12/30/20: 5' 4.5\" (1.638 m). Weight as of this encounter: 273 lb (123.8 kg). Physical Exam  Vitals reviewed. Constitutional:       General: She is not in acute distress. Appearance: Normal appearance. She is well-developed. She is not toxic-appearing or diaphoretic. HENT:      Head: Normocephalic. No right periorbital erythema or left periorbital erythema. Jaw: No trismus. Right Ear: Hearing and external ear normal.      Left Ear: Hearing and external ear normal.      Nose: Nose normal. No mucosal edema or rhinorrhea. Mouth/Throat:      Mouth: Mucous membranes are moist.      Dentition: Normal dentition. Pharynx: Uvula midline. Posterior oropharyngeal erythema present. Tonsils: Tonsillar exudate present. 4+ on the right. 4+ on the left. Eyes:      General: Lids are normal.         Right eye: No discharge. Left eye: No discharge. Conjunctiva/sclera: Conjunctivae normal.      Right eye: Right conjunctiva is not injected. No chemosis. Left eye: No chemosis. Pupils: Pupils are equal, round, and reactive to light. Neck:      Vascular: No JVD. Trachea: Trachea normal.   Cardiovascular:      Rate and Rhythm: Normal rate and regular rhythm. Heart sounds: Normal heart sounds. No murmur heard. Pulmonary:      Effort: Pulmonary effort is normal. No respiratory distress. Breath sounds: Normal breath sounds. No stridor. No wheezing. Abdominal:      General: Bowel sounds are normal. There is no distension. Palpations: Abdomen is soft. Tenderness: There is no abdominal tenderness. Musculoskeletal:         General: No tenderness or signs of injury. Normal range of motion. Cervical back: Full passive range of motion without pain and normal range of motion. Lymphadenopathy:      Cervical: No cervical adenopathy. Skin:     General: Skin is warm and dry. Capillary Refill: Capillary refill takes less than 2 seconds. Findings: No rash. Neurological:      Mental Status: She is alert and oriented to person, place, and time. GCS: GCS eye subscore is 4. GCS verbal subscore is 5. GCS motor subscore is 6. Cranial Nerves: No cranial nerve deficit. Coordination: Coordination normal.      Gait: Gait normal.   Psychiatric:         Mood and Affect: Mood is not anxious or depressed. Speech: Speech normal.         Behavior: Behavior normal. Behavior is not withdrawn or hyperactive. Behavior is cooperative. Thought Content: Thought content normal.         Judgment: Judgment normal.         No flowsheet data found. Lab Results   Component Value Date    CHOL 210 07/16/2019    TRIG 140 07/16/2019    HDL 47 07/16/2019    LDLCALC 135 07/16/2019    GLUCOSE 156 07/16/2019    LABA1C 8.9 08/16/2021    LABA1C 8.5 07/16/2019       The ASCVD Risk score (Kristie Pathak., et al., 2013) failed to calculate for the following reasons:     The 2013 ASCVD risk score is only valid for ages 36 to 78    Immunization History   Administered Date(s) Administered    COVID-19, J&J, PF, 0.5 mL how to activate the co-pay card. Patient's Glucotrol was stopped in anticipation of good effect with the Rybelsus. Return in about 3 months (around 11/16/2021). An electronic signature was used to authenticate this note.     --SAURABH Ingram - CNP on 8/16/2021 at 4:33 PM

## 2021-09-02 RX ORDER — LOSARTAN POTASSIUM 100 MG/1
TABLET ORAL
Qty: 90 TABLET | Refills: 1 | Status: SHIPPED | OUTPATIENT
Start: 2021-09-02 | End: 2022-02-18 | Stop reason: SDUPTHER

## 2021-10-20 RX ORDER — ESCITALOPRAM OXALATE 20 MG/1
20 TABLET ORAL DAILY
Qty: 90 TABLET | Refills: 2 | Status: SHIPPED | OUTPATIENT
Start: 2021-10-20 | End: 2021-11-18 | Stop reason: SDUPTHER

## 2021-11-01 ENCOUNTER — TELEPHONE (OUTPATIENT)
Dept: FAMILY MEDICINE CLINIC | Age: 36
End: 2021-11-01

## 2021-11-01 NOTE — TELEPHONE ENCOUNTER
I do not remember having the discussion and I do not see any note in the chart, but that does not mean we did not discuss it. Lexapro 20 mg is generally the highest dose and if she feels like she needs more this might not be the best medication for her. The best thing to do now would be a [facility] gene test to see if she is not a good metabolizer of the Lexapro and may be make a switch based on the final test results rather than start mixing up medications.  I do not mind doing an outpatient nurse visit for the test.

## 2021-11-01 NOTE — TELEPHONE ENCOUNTER
Patient is on Lexapro and states that during her last appointment that you recommended increasing her dosage from 20 to 40. Patient is now in need of new medication refill sent to the pharmacy. I do see where there was 20 mg sent on 10/20 90 tabs with 2 refills. Patient is stating that she would like the correct prescription sent to the pharmacy.

## 2021-11-02 ENCOUNTER — HOSPITAL ENCOUNTER (EMERGENCY)
Age: 36
Discharge: HOME | End: 2021-11-02
Attending: EMERGENCY MEDICINE
Payer: COMMERCIAL

## 2021-11-02 VITALS
HEART RATE: 80 BPM | DIASTOLIC BLOOD PRESSURE: 76 MMHG | TEMPERATURE: 97.3 F | RESPIRATION RATE: 18 BRPM | WEIGHT: 270 LBS | OXYGEN SATURATION: 96 % | HEIGHT: 63 IN | SYSTOLIC BLOOD PRESSURE: 152 MMHG | BODY MASS INDEX: 47.84 KG/M2

## 2021-11-02 DIAGNOSIS — R03.0 ELEVATED BLOOD PRESSURE READING: ICD-10-CM

## 2021-11-02 DIAGNOSIS — Z20.822 PERSON UNDER INVESTIGATION FOR COVID-19: ICD-10-CM

## 2021-11-02 DIAGNOSIS — J06.9 VIRAL UPPER RESPIRATORY TRACT INFECTION: Primary | ICD-10-CM

## 2021-11-02 DIAGNOSIS — E66.9 DIABETES MELLITUS TYPE 2 IN OBESE (HCC): ICD-10-CM

## 2021-11-02 DIAGNOSIS — E11.69 DIABETES MELLITUS TYPE 2 IN OBESE (HCC): ICD-10-CM

## 2021-11-02 PROCEDURE — 99213 OFFICE O/P EST LOW 20 MIN: CPT | Performed by: EMERGENCY MEDICINE

## 2021-11-02 PROCEDURE — 87636 SARSCOV2 & INF A&B AMP PRB: CPT

## 2021-11-02 PROCEDURE — 99213 OFFICE O/P EST LOW 20 MIN: CPT

## 2021-11-02 ASSESSMENT — ENCOUNTER SYMPTOMS
TROUBLE SWALLOWING: 0
SHORTNESS OF BREATH: 0
COUGH: 0
FACIAL SWELLING: 0
BLOOD IN STOOL: 0
BACK PAIN: 0
WHEEZING: 0
EYE DISCHARGE: 0
DIARRHEA: 0
VOICE CHANGE: 0
STRIDOR: 0
SINUS PRESSURE: 0
SORE THROAT: 1
CONSTIPATION: 0
VOMITING: 0
RHINORRHEA: 1
EYE REDNESS: 0
NAUSEA: 0
CHOKING: 0
ABDOMINAL PAIN: 0
EYE PAIN: 0

## 2021-11-02 ASSESSMENT — PAIN DESCRIPTION - PAIN TYPE: TYPE: ACUTE PAIN

## 2021-11-02 ASSESSMENT — PAIN DESCRIPTION - DESCRIPTORS: DESCRIPTORS: ACHING

## 2021-11-02 ASSESSMENT — PAIN DESCRIPTION - LOCATION: LOCATION: HEAD

## 2021-11-02 ASSESSMENT — PAIN DESCRIPTION - PROGRESSION: CLINICAL_PROGRESSION: NOT CHANGED

## 2021-11-02 ASSESSMENT — PAIN DESCRIPTION - FREQUENCY: FREQUENCY: CONTINUOUS

## 2021-11-02 ASSESSMENT — PAIN SCALES - GENERAL: PAINLEVEL_OUTOF10: 4

## 2021-11-02 ASSESSMENT — PAIN DESCRIPTION - ONSET: ONSET: ON-GOING

## 2021-11-02 NOTE — Clinical Note
Sonny Monique was seen and treated in our emergency department on 11/2/2021. She may return to work on 11/15/2021. No work starting November 2, 2021 until negative COVID-19 test obtained     If you have any questions or concerns, please don't hesitate to call.       Poonam Harmon MD

## 2021-11-02 NOTE — ED PROVIDER NOTES
Comments: Moist membranes, normal airway   HENT:      Head: Normocephalic and atraumatic. Right Ear: Tympanic membrane and external ear normal.      Left Ear: Tympanic membrane and external ear normal.      Ears:      Comments: Oropharynx normal     Nose: Nose normal.      Mouth/Throat:      Pharynx: No oropharyngeal exudate. Comments: Oropharynx normal  Eyes:      General: No scleral icterus. Right eye: No discharge. Left eye: No discharge. Extraocular Movements:      Right eye: Normal extraocular motion. Left eye: Normal extraocular motion. Conjunctiva/sclera: Conjunctivae normal.      Pupils: Pupils are equal, round, and reactive to light. Comments: Conjunctiva clear nonicteric   Neck:      Thyroid: No thyromegaly. Vascular: No JVD. Comments: No meningismus  Cardiovascular:      Rate and Rhythm: Normal rate and regular rhythm. Pulses: Normal pulses. Heart sounds: Normal heart sounds, S1 normal and S2 normal. No murmur heard. No friction rub. No gallop. Comments: No murmur  Pulmonary:      Effort: Pulmonary effort is normal. No respiratory distress. Breath sounds: Normal breath sounds. No stridor. No wheezing or rales. Comments: No cough, lungs clear  Chest:      Chest wall: No tenderness. Abdominal:      General: Bowel sounds are normal. There is no distension. Palpations: Abdomen is soft. There is no mass. Tenderness: There is no abdominal tenderness. There is no right CVA tenderness, left CVA tenderness, guarding or rebound. Comments: Soft nontender   Musculoskeletal:         General: No tenderness. Normal range of motion. Cervical back: Normal range of motion. Comments: Extremities normal   Lymphadenopathy:      Cervical: No cervical adenopathy. Right cervical: No superficial cervical adenopathy. Left cervical: No superficial cervical adenopathy. Skin:     General: Skin is warm and dry. Findings: No erythema or rash. Comments: No rash or bruising on examined areas   Neurological:      Mental Status: She is alert and oriented to person, place, and time. Cranial Nerves: No cranial nerve deficit. Motor: No abnormal muscle tone. Coordination: Coordination normal.      Deep Tendon Reflexes: Reflexes are normal and symmetric. Reflexes normal.      Comments: Appropriate, no focal findings   Psychiatric:         Behavior: Behavior normal.         Thought Content: Thought content normal.         Judgment: Judgment normal.         DIAGNOSTIC RESULTS   Labs: No results found for this visit on 11/02/21. IMAGING:  No orders to display     URGENT CARE COURSE:     Vitals:    11/02/21 1826   BP: (!) 152/76   Pulse: 80   Resp: 18   Temp: 97.3 °F (36.3 °C)   TempSrc: Temporal   SpO2: 96%   Weight: 270 lb (122.5 kg)   Height: 5' 3\" (1.6 m)       Medications - No data to display  PROCEDURES:  None  FINALIMPRESSION      1. Viral upper respiratory tract infection    2. Person under investigation for COVID-19    3. Diabetes mellitus type 2 in obese (HCC)    4. Elevated blood pressure reading        DISPOSITION/PLAN   DISPOSITION Decision To Discharge 11/02/2021 07:07:32 PM  Nontoxic, well-hydrated, normal airway. No airway abscess or epiglottitis, sepsis, CNS infection, pneumonia, hypoxia, bronchospasm. COVID-19 test sent. Patient has viral upper respiratory infection. Patient to use Tylenol for fever and pain, rest, increase oral clear liquids. Patient given written and verbal instructions regarding quarantine and COVID-19 treatment. She was given COVID-19 regimen. Patient to follow-up with PCP in 5 days if problems persist, and she understands to go to ED if worse. Patient to notify health department if COVID-19 test positive.   PATIENT REFERRED TO:  Erinn Vaz MD  [address] [facility]  [address] [facility]  [phone]    Schedule an appointment as soon as possible for a visit in 11 days  Recheck if problems persist, go to emergency if worse    DISCHARGE MEDICATIONS:  Discharge Medication List as of 11/2/2021  7:09 PM        Discharge Medication List as of 11/2/2021  7:09 PM          MD Kralee Austin MD  11/02/21 [address] [place] [address] Luis Vaz MD  11/02/21 1927

## 2021-11-03 LAB
INFLUENZA A: NOT DETECTED
INFLUENZA B: NOT DETECTED
SARS-COV-2 RNA, RT PCR: NOT DETECTED

## 2021-11-17 ENCOUNTER — HOSPITAL ENCOUNTER (OUTPATIENT)
Age: 36
Discharge: HOME | End: 2021-11-17
Payer: COMMERCIAL

## 2021-11-17 DIAGNOSIS — Z00.00 ANNUAL PHYSICAL EXAM: ICD-10-CM

## 2021-11-17 LAB
ALBUMIN SERPL-MCNC: 4.5 G/DL (ref 3.5–5.1)
ALP BLD-CCNC: 98 U/L (ref 38–126)
ALT SERPL-CCNC: 75 U/L (ref 11–66)
ANION GAP SERPL CALCULATED.3IONS-SCNC: 12 MEQ/L (ref 8–16)
AST SERPL-CCNC: 45 U/L (ref 5–40)
BASOPHILS # BLD: 0.6 %
BASOPHILS ABSOLUTE: 0.1 THOU/MM3 (ref 0–0.1)
BILIRUB SERPL-MCNC: 0.5 MG/DL (ref 0.3–1.2)
BILIRUBIN DIRECT: < 0.2 MG/DL (ref 0–0.3)
BUN BLDV-MCNC: 14 MG/DL (ref 7–22)
CALCIUM SERPL-MCNC: 10 MG/DL (ref 8.5–10.5)
CHLORIDE BLD-SCNC: 94 MEQ/L (ref 98–111)
CHOLESTEROL, FASTING: 214 MG/DL (ref 100–199)
CO2: 27 MEQ/L (ref 23–33)
CREAT SERPL-MCNC: 0.5 MG/DL (ref 0.4–1.2)
CREATININE, URINE: 78.7 MG/DL
EOSINOPHIL # BLD: 2.5 %
EOSINOPHILS ABSOLUTE: 0.2 THOU/MM3 (ref 0–0.4)
ERYTHROCYTE [DISTWIDTH] IN BLOOD BY AUTOMATED COUNT: 14.1 % (ref 11.5–14.5)
ERYTHROCYTE [DISTWIDTH] IN BLOOD BY AUTOMATED COUNT: 39.9 FL (ref 35–45)
GFR SERPL CREATININE-BSD FRML MDRD: > 90 ML/MIN/1.73M2
GLUCOSE BLD-MCNC: 163 MG/DL (ref 70–108)
HCT VFR BLD CALC: 39 % (ref 37–47)
HDLC SERPL-MCNC: 48 MG/DL
HEMOGLOBIN: 12.6 GM/DL (ref 12–16)
IMMATURE GRANS (ABS): 0.05 THOU/MM3 (ref 0–0.07)
IMMATURE GRANULOCYTES: 0.5 %
LDL CHOLESTEROL CALCULATED: 132 MG/DL
LYMPHOCYTES # BLD: 24.6 %
LYMPHOCYTES ABSOLUTE: 2.4 THOU/MM3 (ref 1–4.8)
MCH RBC QN AUTO: 25.5 PG (ref 26–33)
MCHC RBC AUTO-ENTMCNC: 32.3 GM/DL (ref 32.2–35.5)
MCV RBC AUTO: 78.8 FL (ref 81–99)
MICROALBUMIN UR-MCNC: 6.52 MG/DL
MICROALBUMIN/CREAT UR-RTO: 83 MG/G (ref 0–30)
MONOCYTES # BLD: 5.4 %
MONOCYTES ABSOLUTE: 0.5 THOU/MM3 (ref 0.4–1.3)
NUCLEATED RED BLOOD CELLS: 0 /100 WBC
PLATELET # BLD: 305 THOU/MM3 (ref 130–400)
PMV BLD AUTO: 11.3 FL (ref 9.4–12.4)
POTASSIUM SERPL-SCNC: 4.2 MEQ/L (ref 3.5–5.2)
RBC # BLD: 4.95 MILL/MM3 (ref 4.2–5.4)
SEG NEUTROPHILS: 66.4 %
SEGMENTED NEUTROPHILS ABSOLUTE COUNT: 6.5 THOU/MM3 (ref 1.8–7.7)
SODIUM BLD-SCNC: 133 MEQ/L (ref 135–145)
TOTAL PROTEIN: 7.4 G/DL (ref 6.1–8)
TRIGLYCERIDE, FASTING: 172 MG/DL (ref 0–199)
WBC # BLD: 9.8 THOU/MM3 (ref 4.8–10.8)

## 2021-11-17 PROCEDURE — 80061 LIPID PANEL: CPT

## 2021-11-17 PROCEDURE — 82043 UR ALBUMIN QUANTITATIVE: CPT

## 2021-11-17 PROCEDURE — 36415 COLL VENOUS BLD VENIPUNCTURE: CPT

## 2021-11-17 PROCEDURE — 82248 BILIRUBIN DIRECT: CPT

## 2021-11-17 PROCEDURE — 85025 COMPLETE CBC W/AUTO DIFF WBC: CPT

## 2021-11-17 PROCEDURE — 80053 COMPREHEN METABOLIC PANEL: CPT

## 2021-11-18 ENCOUNTER — OFFICE VISIT (OUTPATIENT)
Dept: FAMILY MEDICINE CLINIC | Age: 36
End: 2021-11-18
Payer: COMMERCIAL

## 2021-11-18 VITALS
WEIGHT: 278 LBS | SYSTOLIC BLOOD PRESSURE: 130 MMHG | HEART RATE: 82 BPM | DIASTOLIC BLOOD PRESSURE: 82 MMHG | BODY MASS INDEX: 49.25 KG/M2 | OXYGEN SATURATION: 98 % | RESPIRATION RATE: 20 BRPM

## 2021-11-18 DIAGNOSIS — F32.A DEPRESSION, UNSPECIFIED DEPRESSION TYPE: ICD-10-CM

## 2021-11-18 DIAGNOSIS — I10 ESSENTIAL HYPERTENSION: ICD-10-CM

## 2021-11-18 DIAGNOSIS — F90.0 ATTENTION DEFICIT HYPERACTIVITY DISORDER (ADHD), PREDOMINANTLY INATTENTIVE TYPE: ICD-10-CM

## 2021-11-18 DIAGNOSIS — E78.5 HYPERLIPIDEMIA, UNSPECIFIED HYPERLIPIDEMIA TYPE: ICD-10-CM

## 2021-11-18 DIAGNOSIS — E11.65 UNCONTROLLED TYPE 2 DIABETES MELLITUS WITH HYPERGLYCEMIA (HCC): Primary | ICD-10-CM

## 2021-11-18 PROCEDURE — 90471 IMMUNIZATION ADMIN: CPT | Performed by: FAMILY MEDICINE

## 2021-11-18 PROCEDURE — 99214 OFFICE O/P EST MOD 30 MIN: CPT | Performed by: FAMILY MEDICINE

## 2021-11-18 PROCEDURE — 90674 CCIIV4 VAC NO PRSV 0.5 ML IM: CPT | Performed by: FAMILY MEDICINE

## 2021-11-18 PROCEDURE — 3052F HG A1C>EQUAL 8.0%<EQUAL 9.0%: CPT | Performed by: FAMILY MEDICINE

## 2021-11-18 RX ORDER — HYDROCHLOROTHIAZIDE 25 MG/1
TABLET ORAL
Qty: 90 TABLET | Refills: 3 | Status: SHIPPED | OUTPATIENT
Start: 2021-11-18 | End: 2021-12-09

## 2021-11-18 RX ORDER — DULAGLUTIDE 0.75 MG/.5ML
0.75 INJECTION, SOLUTION SUBCUTANEOUS WEEKLY
Qty: 4 PEN | Refills: 0
Start: 2021-11-18 | End: 2022-01-24 | Stop reason: SDUPTHER

## 2021-11-18 RX ORDER — GLIPIZIDE 5 MG/1
1 TABLET, FILM COATED, EXTENDED RELEASE ORAL DAILY
COMMUNITY
Start: 2021-08-31 | End: 2021-12-06

## 2021-11-18 RX ORDER — ROSUVASTATIN CALCIUM 10 MG/1
10 TABLET, COATED ORAL DAILY
Qty: 90 TABLET | Refills: 3 | Status: SHIPPED | OUTPATIENT
Start: 2021-11-18 | End: 2022-04-22 | Stop reason: SDUPTHER

## 2021-11-18 RX ORDER — ESCITALOPRAM OXALATE 10 MG/1
TABLET ORAL
Qty: 42 TABLET | Refills: 0 | Status: SHIPPED | OUTPATIENT
Start: 2021-11-18 | End: 2021-12-29 | Stop reason: ALTCHOICE

## 2021-11-18 RX ORDER — ALOGLIPTIN 25 MG/1
TABLET, FILM COATED ORAL
Qty: 90 TABLET | Refills: 3 | Status: SHIPPED | OUTPATIENT
Start: 2021-11-18 | End: 2021-12-29

## 2021-11-18 RX ORDER — BUPROPION HYDROCHLORIDE 150 MG/1
150 TABLET ORAL EVERY MORNING
Qty: 30 TABLET | Refills: 3 | Status: SHIPPED | OUTPATIENT
Start: 2021-11-18 | End: 2022-02-18 | Stop reason: SDUPTHER

## 2021-11-18 NOTE — PROGRESS NOTES
Immunizations Administered     Name Date Dose Route    Influenza, MDCK Quadv, IM, PF (Flucelvax 2 yrs and older) 11/18/2021 0.5 mL Intramuscular    Site: Deltoid- Right    Lot: [id number]    NDC: 91448-508-77

## 2021-11-19 ASSESSMENT — ENCOUNTER SYMPTOMS
COUGH: 0
SHORTNESS OF BREATH: 0
RHINORRHEA: 0
CHEST TIGHTNESS: 0
VOMITING: 0
SORE THROAT: 0
BACK PAIN: 0
DIARRHEA: 0
ABDOMINAL PAIN: 0
NAUSEA: 0
EYES NEGATIVE: 1

## 2021-11-19 NOTE — PROGRESS NOTES
[address] [place] [facility] [address]  Dept: [phone]  Dept Fax: [phone]  Loc: [phone]  PROGRESS NOTE      VisitDate: 11/18/2021    Vianney Esteban is a 43 y.o. female who presents today for:     Chief Complaint   Patient presents with    3 Month Follow-Up     DM. Rybelsus was too expensive but did ok on it. Wants tested for ADHD.  Discuss Labs    Discuss Medications     discrepancy regarding lexapro dosage.  Flu Vaccine         Subjective:  HPI  Patient comes in follow-up diabetes. Not managing or checking her blood sugars. Discontinued some medication due to cost.  Labs reviewed with the patient. Cholesterol elevated sugars elevated. She also reports worsening depression did not do well with increased dose of Lexapro. Patient hypertension stable. History of ADHD has been untreated for some time. Discussed overlap of depression anxiety with ADHD symptoms and the need for psychology referral for definitive diagnosis. Review of Systems   Constitutional: Negative for activity change, appetite change, fatigue and fever. HENT: Negative for congestion, rhinorrhea and sore throat. Eyes: Negative. Respiratory: Negative for cough, chest tightness and shortness of breath. Cardiovascular: Negative for chest pain and palpitations. Gastrointestinal: Negative for abdominal pain, diarrhea, nausea and vomiting. Genitourinary: Negative for dysuria and urgency. Musculoskeletal: Negative for arthralgias and back pain. Neurological: Negative for dizziness and headaches. Psychiatric/Behavioral: Positive for decreased concentration, dysphoric mood and sleep disturbance. The patient is nervous/anxious. Past Medical History:   Diagnosis Date    Diabetes mellitus ([address]     Hypertension       History reviewed. No pertinent surgical history.   Family History   Problem Relation Age of Onset    Diabetes Mother  Depression Mother     High Blood Pressure Father     Other Father     High Blood Pressure Paternal Grandmother      Social History     Tobacco Use    Smoking status: Former Smoker     Years: 2.00     Types: Cigarettes     Quit date: 10/1/2016     Years since quittin.1    Smokeless tobacco: Never Used    Tobacco comment: 2-3 cigarettes per day   Substance Use Topics    Alcohol use: Yes     Comment: social      Current Outpatient Medications   Medication Sig Dispense Refill    glipiZIDE (GLUCOTROL XL) 5 MG extended release tablet Take 1 tablet by mouth daily      alogliptin (NESINA) 25 MG TABS tablet TAKE 1 TABLET BY MOUTH EVERY DAY 90 tablet 3    hydroCHLOROthiazide (HYDRODIURIL) 25 MG tablet TAKE 1 TABLET BY MOUTH EVERY DAY WITH LOSARTAN 90 tablet 3    escitalopram (LEXAPRO) 10 MG tablet 2 tabs daily for 14 days, then one tab daily for 14 days 42 tablet 0    Dulaglutide (TRULICITY) 8.08 WF/7.2KA SOPN Inject 0.75 mg into the skin once a week 4 pen 0    rosuvastatin (CRESTOR) 10 MG tablet Take 1 tablet by mouth daily 90 tablet 3    buPROPion (WELLBUTRIN XL) 150 MG extended release tablet Take 1 tablet by mouth every morning 30 tablet 3    losartan (COZAAR) 100 MG tablet TAKE 1 TABLET BY MOUTH EVERY DAY WITH HYDROCHLOROTHIAZIDE 90 tablet 1    metFORMIN (GLUCOPHAGE-XR) 500 MG extended release tablet TAKE 2 TABLETS BY MOUTH TWICE DAILY 120 tablet 3     No current facility-administered medications for this visit.      Allergies   Allergen Reactions    Fluconazole Rash     Health Maintenance   Topic Date Due    Pneumococcal 0-64 years Vaccine (1 of 2 - PPSV23) Never done    Diabetic foot exam  Never done    Diabetic retinal exam  Never done    Hepatitis B vaccine (1 of 3 - Risk 3-dose series) Never done    DTaP/Tdap/Td vaccine (5 - Tdap) 2004    Cervical cancer screen  Never done    COVID-19 Vaccine (2 - Booster for Maria L series) 2021    A1C test (Diabetic or Prediabetic) tablet 90 tablet 3     Sig: TAKE 1 TABLET BY MOUTH EVERY DAY    hydroCHLOROthiazide (HYDRODIURIL) 25 MG tablet 90 tablet 3     Sig: TAKE 1 TABLET BY MOUTH EVERY DAY WITH LOSARTAN    escitalopram (LEXAPRO) 10 MG tablet 42 tablet 0     Si tabs daily for 14 days, then one tab daily for 14 days    Dulaglutide (TRULICITY) 6.10 GB/3.9OX SOPN 4 pen 0     Sig: Inject 0.75 mg into the skin once a week    rosuvastatin (CRESTOR) 10 MG tablet 90 tablet 3     Sig: Take 1 tablet by mouth daily    buPROPion (WELLBUTRIN XL) 150 MG extended release tablet 30 tablet 3     Sig: Take 1 tablet by mouth every morning     Orders Placed This Encounter   Procedures    INFLUENZA, MDCK QUADV, 2 YRS AND OLDER, IM, PF, PREFILL SYR OR SDV, 0.5ML (FLUCELVAX QUADV, PF)    Hemoglobin A1C     Standing Status:   Future     Standing Expiration Date:   2022    Comprehensive Metabolic Panel     Standing Status:   Future     Standing Expiration Date:   2022    Lipid Panel     Standing Status:   Future     Standing Expiration Date:   2022     Order Specific Question:   Is Patient Fasting?/# of Hours     Answer:   yes/12   [facility]     Referral Priority:   Routine     Referral Type:   Eval and Treat     Referral Reason:   Specialty Services Required     Requested Specialty:   Psychology     Number of Visits Requested:   1     Reviewed medication changes as ordered above. Discussed medications side effects dosing regimen. Over 30 minutes face-to-face time and over 50% of time spent on counseling  Patient giveneducational materials - see patient instructions. Discussed use, benefit, and side effects of prescribed medications. All patient questions answered. Pt voiced understanding. Reviewed health maintenance. Patient agreedwith treatment plan. Follow up as directed. **This report has been created using voice recognition software.  It may contain minor errorswhich are inherent in voice recognition technology. **       Electronically signed by Mejia Beckwith MD on 11/19/2021 at 6:08 AM

## 2021-12-06 RX ORDER — GLIPIZIDE 5 MG/1
TABLET, FILM COATED, EXTENDED RELEASE ORAL
Qty: 90 TABLET | Refills: 1 | Status: SHIPPED | OUTPATIENT
Start: 2021-12-06 | End: 2022-04-22 | Stop reason: SDUPTHER

## 2021-12-08 RX ORDER — METFORMIN HYDROCHLORIDE 500 MG/1
TABLET, EXTENDED RELEASE ORAL
Qty: 120 TABLET | Refills: 3 | Status: SHIPPED | OUTPATIENT
Start: 2021-12-08 | End: 2022-04-22 | Stop reason: SDUPTHER

## 2021-12-09 RX ORDER — HYDROCHLOROTHIAZIDE 25 MG/1
TABLET ORAL
Qty: 90 TABLET | Refills: 3 | Status: SHIPPED | OUTPATIENT
Start: 2021-12-09 | End: 2022-04-22 | Stop reason: SDUPTHER

## 2021-12-22 RX ORDER — ESCITALOPRAM OXALATE 10 MG/1
TABLET ORAL
Qty: 42 TABLET | Refills: 0 | OUTPATIENT
Start: 2021-12-22

## 2021-12-29 ENCOUNTER — OFFICE VISIT (OUTPATIENT)
Dept: FAMILY MEDICINE CLINIC | Age: 36
End: 2021-12-29
Payer: COMMERCIAL

## 2021-12-29 VITALS
WEIGHT: 270 LBS | DIASTOLIC BLOOD PRESSURE: 66 MMHG | HEART RATE: 68 BPM | TEMPERATURE: 98.1 F | OXYGEN SATURATION: 98 % | BODY MASS INDEX: 47.83 KG/M2 | RESPIRATION RATE: 20 BRPM | SYSTOLIC BLOOD PRESSURE: 120 MMHG

## 2021-12-29 DIAGNOSIS — Z20.822 SUSPECTED COVID-19 VIRUS INFECTION: Primary | ICD-10-CM

## 2021-12-29 DIAGNOSIS — B34.9 ACUTE VIRAL SYNDROME: ICD-10-CM

## 2021-12-29 LAB
Lab: NORMAL
QC PASS/FAIL: NORMAL
SARS-COV-2 RDRP RESP QL NAA+PROBE: NEGATIVE

## 2021-12-29 PROCEDURE — 87635 SARS-COV-2 COVID-19 AMP PRB: CPT | Performed by: FAMILY MEDICINE

## 2021-12-29 PROCEDURE — 99213 OFFICE O/P EST LOW 20 MIN: CPT | Performed by: FAMILY MEDICINE

## 2021-12-29 NOTE — LETTER
[facility]  [address]  [facility] [address]  Phone: [phone]  Fax: [phone]    Honey Casiano MD        December 29, 2021     Patient: Merline Camera   YOB: 1985   Date of Visit: 12/29/2021       To Whom It May Concern: It is my medical opinion that Cathy Parsons should remain out of work until 1/3/22. If you have any questions or concerns, please don't hesitate to call.     Sincerely,        Honey Casiano MD

## 2022-01-02 ASSESSMENT — ENCOUNTER SYMPTOMS
ABDOMINAL PAIN: 0
RHINORRHEA: 1
SORE THROAT: 1
SHORTNESS OF BREATH: 1
EYES NEGATIVE: 1
COUGH: 1
SINUS PAIN: 1
BACK PAIN: 0
DIARRHEA: 0
VOMITING: 0
NAUSEA: 0
CHEST TIGHTNESS: 0

## 2022-01-02 NOTE — PROGRESS NOTES
[address] [address]  Dept: [phone]  Dept Fax: [phone]  Loc: [phone]  PROGRESS NOTE      VisitDate: 2021    Merline Camera is a 43 y.o. female who presents today for:     Chief Complaint   Patient presents with    Cough     ST, fatigue, low grade temp, mild sob with activity, h/a, loss of smell and taste, sinus pressure, yellowish green nasal drainage. Sx for 2 days, raman santacruz. Home test yesterday was negative. Subjective:  HPI  Patient presents with 2-day history of sore throat fatigue low-grade fever with mild shortness of breath and green nasal discharge. No relief with over-the-counter medications    Review of Systems   Constitutional: Positive for fatigue and fever. Negative for activity change and appetite change. HENT: Positive for congestion, postnasal drip, rhinorrhea, sinus pain and sore throat. Eyes: Negative. Respiratory: Positive for cough and shortness of breath. Negative for chest tightness. Cardiovascular: Negative for chest pain and palpitations. Gastrointestinal: Negative for abdominal pain, diarrhea, nausea and vomiting. Genitourinary: Negative for dysuria and urgency. Musculoskeletal: Negative for arthralgias and back pain. Neurological: Negative for dizziness and headaches. Psychiatric/Behavioral: Negative for dysphoric mood. The patient is not nervous/anxious. Past Medical History:   Diagnosis Date    Diabetes mellitus ([address]     Hypertension       History reviewed. No pertinent surgical history.   Family History   Problem Relation Age of Onset    Diabetes Mother     Depression Mother     High Blood Pressure Father     Other Father     High Blood Pressure Paternal Grandmother      Social History     Tobacco Use    Smoking status: Former Smoker     Years: 2.00     Types: Cigarettes     Quit date: 10/1/2016     Years since quittin.2    Smokeless tobacco: Never Used   Substance Use Topics    Alcohol use: Yes     Comment: social      Current Outpatient Medications   Medication Sig Dispense Refill    hydroCHLOROthiazide (HYDRODIURIL) 25 MG tablet TAKE 1 TABLET BY MOUTH EVERY DAY WITH LOSARTAN 90 tablet 3    metFORMIN (GLUCOPHAGE-XR) 500 MG extended release tablet TAKE 2 TABLETS BY MOUTH TWICE DAILY 120 tablet 3    glipiZIDE (GLUCOTROL XL) 5 MG extended release tablet TAKE 1 TABLET BY MOUTH EVERY DAY WITH BREAKFAST 90 tablet 1    rosuvastatin (CRESTOR) 10 MG tablet Take 1 tablet by mouth daily 90 tablet 3    buPROPion (WELLBUTRIN XL) 150 MG extended release tablet Take 1 tablet by mouth every morning 30 tablet 3    losartan (COZAAR) 100 MG tablet TAKE 1 TABLET BY MOUTH EVERY DAY WITH HYDROCHLOROTHIAZIDE 90 tablet 1    Dulaglutide (TRULICITY) 6.86 KJ/2.2AO SOPN Inject 0.75 mg into the skin once a week 4 pen 0     No current facility-administered medications for this visit. Allergies   Allergen Reactions    Fluconazole Rash     Health Maintenance   Topic Date Due    Pneumococcal 0-64 years Vaccine (1 of 2 - PPSV23) Never done    Diabetic foot exam  Never done    Diabetic retinal exam  Never done    Hepatitis B vaccine (1 of 3 - Risk 3-dose series) Never done    DTaP/Tdap/Td vaccine (5 - Tdap) 07/09/2004    Cervical cancer screen  Never done    A1C test (Diabetic or Prediabetic)  08/16/2022    Depression Monitoring  08/16/2022    Diabetic microalbuminuria test  11/17/2022    Lipid screen  11/17/2022    Potassium monitoring  11/17/2022    Creatinine monitoring  11/17/2022    Flu vaccine  Completed    COVID-19 Vaccine  Completed    Hepatitis A vaccine  Aged Out    Hib vaccine  Aged Out    Meningococcal (ACWY) vaccine  Aged Out    Varicella vaccine  Discontinued    Hepatitis C screen  Discontinued    HIV screen  Discontinued         Objective:     Physical Exam  Constitutional:       General: She is not in acute distress. Appearance: She is well-developed. She is not diaphoretic. HENT:      Head: Normocephalic and atraumatic. Eyes:      General: No scleral icterus. Conjunctiva/sclera: Conjunctivae normal.   Neck:      Thyroid: No thyromegaly. Vascular: No JVD. Comments: No bruits  Cardiovascular:      Rate and Rhythm: Normal rate and regular rhythm. Heart sounds: Normal heart sounds. Pulmonary:      Effort: Pulmonary effort is normal. No respiratory distress. Breath sounds: Normal breath sounds. No wheezing or rales. Abdominal:      Palpations: Abdomen is soft. There is no mass. Tenderness: There is no abdominal tenderness. There is no guarding. Musculoskeletal:         General: No tenderness. Skin:     General: Skin is warm and dry. Findings: No rash. Neurological:      Mental Status: She is alert and oriented to person, place, and time. Cranial Nerves: No cranial nerve deficit. /66   Pulse 68   Temp 98.1 °F (36.7 °C) (Oral)   Resp 20   Wt 270 lb (122.5 kg)   LMP 12/02/2021   SpO2 98%   BMI 47.83 kg/m²     Covid negative  Impression/Plan:  1. Suspected COVID-19 virus infection    2. Acute viral syndrome      Requested Prescriptions      No prescriptions requested or ordered in this encounter     Orders Placed This Encounter   Procedures    POCT COVID-19, Rapid     Order Specific Question:   Is this test for diagnosis or screening? Answer:   Diagnosis of ill patient     Order Specific Question:   Symptomatic for COVID-19 as defined by CDC? Answer:   Yes     Order Specific Question:   Date of Symptom Onset     Answer:   12/27/2021     Order Specific Question:   Hospitalized for COVID-19? Answer:   No     Order Specific Question:   Admitted to ICU for COVID-19? Answer:   No     Order Specific Question:   Employed in healthcare setting? Answer:   No     Order Specific Question:   Resident in a congregate (group) care setting? Answer:    No Order Specific Question:   Pregnant? Answer:   No     Order Specific Question:   Previously tested for COVID-19? Answer:   Yes   Reviewed symptomatic management call if symptoms worsen or persist    Patient giveneducational materials - see patient instructions. Discussed use, benefit, and side effects of prescribed medications. All patient questions answered. Pt voiced understanding. Reviewed health maintenance. Patient agreedwith treatment plan. Follow up as directed. **This report has been created using voice recognition software. It may contain minor errorswhich are inherent in voice recognition technology. **       Electronically signed by Tosin Chaves MD on 1/2/2022 at 8:50 AM

## 2022-01-24 RX ORDER — DULAGLUTIDE 0.75 MG/.5ML
0.75 INJECTION, SOLUTION SUBCUTANEOUS WEEKLY
Qty: 4 PEN | Refills: 0 | Status: SHIPPED | OUTPATIENT
Start: 2022-01-24 | End: 2022-02-21

## 2022-02-18 ENCOUNTER — OFFICE VISIT (OUTPATIENT)
Dept: FAMILY MEDICINE CLINIC | Age: 37
End: 2022-02-18
Payer: COMMERCIAL

## 2022-02-18 VITALS
WEIGHT: 272 LBS | SYSTOLIC BLOOD PRESSURE: 122 MMHG | DIASTOLIC BLOOD PRESSURE: 80 MMHG | TEMPERATURE: 98.4 F | HEART RATE: 86 BPM | BODY MASS INDEX: 48.18 KG/M2 | RESPIRATION RATE: 16 BRPM | OXYGEN SATURATION: 97 %

## 2022-02-18 DIAGNOSIS — F32.A DEPRESSION, UNSPECIFIED DEPRESSION TYPE: ICD-10-CM

## 2022-02-18 DIAGNOSIS — I10 ESSENTIAL HYPERTENSION: ICD-10-CM

## 2022-02-18 DIAGNOSIS — E11.65 UNCONTROLLED TYPE 2 DIABETES MELLITUS WITH HYPERGLYCEMIA (HCC): Primary | ICD-10-CM

## 2022-02-18 DIAGNOSIS — E78.5 HYPERLIPIDEMIA, UNSPECIFIED HYPERLIPIDEMIA TYPE: ICD-10-CM

## 2022-02-18 PROCEDURE — 99214 OFFICE O/P EST MOD 30 MIN: CPT | Performed by: FAMILY MEDICINE

## 2022-02-18 RX ORDER — DULAGLUTIDE 0.75 MG/.5ML
0.75 INJECTION, SOLUTION SUBCUTANEOUS WEEKLY
Qty: 4 PEN | Refills: 0 | Status: CANCELLED | OUTPATIENT
Start: 2022-02-18

## 2022-02-18 RX ORDER — LOSARTAN POTASSIUM 100 MG/1
TABLET ORAL
Qty: 90 TABLET | Refills: 3 | Status: SHIPPED | OUTPATIENT
Start: 2022-02-18

## 2022-02-18 RX ORDER — BUPROPION HYDROCHLORIDE 150 MG/1
150 TABLET ORAL EVERY MORNING
Qty: 90 TABLET | Refills: 3 | Status: SHIPPED | OUTPATIENT
Start: 2022-02-18

## 2022-02-20 ASSESSMENT — ENCOUNTER SYMPTOMS
ABDOMINAL PAIN: 0
DIARRHEA: 0
RHINORRHEA: 0
EYES NEGATIVE: 1
SHORTNESS OF BREATH: 0
CHEST TIGHTNESS: 0
NAUSEA: 0
BACK PAIN: 0
COUGH: 0
SORE THROAT: 0
VOMITING: 0

## 2022-02-20 NOTE — PROGRESS NOTES
[address] [place] [facility] [address]  Dept: [phone]  Dept Fax: [phone]  Loc: [phone]  PROGRESS NOTE      VisitDate: 2022    Edward Garcia is a 43 y.o. female who presents today for:     Chief Complaint   Patient presents with    3 Month Follow-Up     DM- has not completed blood work yet- re-printed orders for patient. Subjective:  HPI  Patient was in follow-up diabetes, she not complete her blood work. History of hyperlipidemia status unknown awaiting labs. History hypertension stable and well controlled. History of depression doing better with current medications. Monitoring glucose levels she has seen her readings improved slightly at home    Review of Systems   Constitutional: Negative for activity change, appetite change, fatigue and fever. HENT: Negative for congestion, rhinorrhea and sore throat. Eyes: Negative. Respiratory: Negative for cough, chest tightness and shortness of breath. Cardiovascular: Negative for chest pain and palpitations. Gastrointestinal: Negative for abdominal pain, diarrhea, nausea and vomiting. Genitourinary: Negative for dysuria and urgency. Musculoskeletal: Negative for arthralgias and back pain. Neurological: Negative for dizziness and headaches. Psychiatric/Behavioral: Negative for dysphoric mood. The patient is not nervous/anxious. Past Medical History:   Diagnosis Date    Diabetes mellitus ([address]     Hypertension       History reviewed. No pertinent surgical history.   Family History   Problem Relation Age of Onset    Diabetes Mother     Depression Mother     High Blood Pressure Father     Other Father     High Blood Pressure Paternal Grandmother      Social History     Tobacco Use    Smoking status: Former Smoker     Years: 2.00     Types: Cigarettes     Quit date: 10/1/2016     Years since quittin.3    Smokeless tobacco: Never Used Substance Use Topics    Alcohol use: Yes     Comment: social      Current Outpatient Medications   Medication Sig Dispense Refill    losartan (COZAAR) 100 MG tablet TAKE 1 TABLET BY MOUTH EVERY DAY WITH HYDROCHLOROTHIAZIDE 90 tablet 3    buPROPion (WELLBUTRIN XL) 150 MG extended release tablet Take 1 tablet by mouth every morning 90 tablet 3    Dulaglutide (TRULICITY) 8.86 AX/4.9PQ SOPN Inject 0.75 mg into the skin once a week 4 pen 0    hydroCHLOROthiazide (HYDRODIURIL) 25 MG tablet TAKE 1 TABLET BY MOUTH EVERY DAY WITH LOSARTAN 90 tablet 3    metFORMIN (GLUCOPHAGE-XR) 500 MG extended release tablet TAKE 2 TABLETS BY MOUTH TWICE DAILY 120 tablet 3    glipiZIDE (GLUCOTROL XL) 5 MG extended release tablet TAKE 1 TABLET BY MOUTH EVERY DAY WITH BREAKFAST 90 tablet 1    rosuvastatin (CRESTOR) 10 MG tablet Take 1 tablet by mouth daily 90 tablet 3     No current facility-administered medications for this visit. Allergies   Allergen Reactions    Fluconazole Rash     Health Maintenance   Topic Date Due    Pneumococcal 0-64 years Vaccine (1 of 2 - PPSV23) Never done    Diabetic foot exam  Never done    Diabetic retinal exam  Never done    Hepatitis B vaccine (1 of 3 - Risk 3-dose series) Never done    DTaP/Tdap/Td vaccine (5 - Tdap) 07/09/2004    Cervical cancer screen  Never done    A1C test (Diabetic or Prediabetic)  08/16/2022    Depression Monitoring  08/16/2022    Diabetic microalbuminuria test  11/17/2022    Lipid screen  11/17/2022    Potassium monitoring  11/17/2022    Creatinine monitoring  11/17/2022    Flu vaccine  Completed    COVID-19 Vaccine  Completed    Hepatitis A vaccine  Aged Out    Hib vaccine  Aged Out    Meningococcal (ACWY) vaccine  Aged Out    Varicella vaccine  Discontinued    Hepatitis C screen  Discontinued    HIV screen  Discontinued         Objective:     Physical Exam  Constitutional:       General: She is not in acute distress.      Appearance: She is well-developed. She is not diaphoretic. HENT:      Head: Normocephalic and atraumatic. Eyes:      General: No scleral icterus. Conjunctiva/sclera: Conjunctivae normal.   Neck:      Thyroid: No thyromegaly. Vascular: No JVD. Comments: No bruits  Cardiovascular:      Rate and Rhythm: Normal rate and regular rhythm. Heart sounds: Normal heart sounds. Pulmonary:      Effort: Pulmonary effort is normal. No respiratory distress. Breath sounds: Normal breath sounds. No wheezing or rales. Abdominal:      Palpations: Abdomen is soft. There is no mass. Tenderness: There is no abdominal tenderness. There is no guarding. Musculoskeletal:         General: No tenderness. Skin:     General: Skin is warm and dry. Findings: No rash. Neurological:      Mental Status: She is alert and oriented to person, place, and time. Cranial Nerves: No cranial nerve deficit. /80   Pulse 86   Temp 98.4 °F (36.9 °C) (Oral)   Resp 16   Wt 272 lb (123.4 kg)   LMP 01/30/2022 (Exact Date)   SpO2 97%   BMI 48.18 kg/m²       Impression/Plan:  1. Uncontrolled type 2 diabetes mellitus with hyperglycemia ([facility]    2. Hyperlipidemia, unspecified hyperlipidemia type    3. Essential hypertension    4. Depression, unspecified depression type      Requested Prescriptions     Signed Prescriptions Disp Refills    losartan (COZAAR) 100 MG tablet 90 tablet 3     Sig: TAKE 1 TABLET BY MOUTH EVERY DAY WITH HYDROCHLOROTHIAZIDE    buPROPion (WELLBUTRIN XL) 150 MG extended release tablet 90 tablet 3     Sig: Take 1 tablet by mouth every morning     No orders of the defined types were placed in this encounter. Patient giveneducational materials - see patient instructions. Discussed use, benefit, and side effects of prescribed medications. All patient questions answered. Pt voiced understanding. Reviewed health maintenance. Patient agreedwith treatment plan. Follow up as directed.      **This

## 2022-02-21 RX ORDER — DULAGLUTIDE 0.75 MG/.5ML
INJECTION, SOLUTION SUBCUTANEOUS
Qty: 2 ML | Refills: 1 | Status: SHIPPED | OUTPATIENT
Start: 2022-02-21 | End: 2022-04-22

## 2022-02-21 NOTE — TELEPHONE ENCOUNTER
This medication refill is regarding a electronic request.  Refill requested by Jovan. Requested Prescriptions     Pending Prescriptions Disp Refills    [facility] SOPN [Pharmacy Med Name: [address] [place] [address] 0.09JF/9.0SJ SDP 0.5ML] 2 mL      Sig: ADMINISTER 0.75 MG UNDER THE SKIN 1 TIME A WEEK       Date of last visit: 2/18/2022   Date of next visit: None  Date of last refill: 1/24/2022  4/0    Last Hemoglobin A1C:    Lab Results   Component Value Date    LABA1C 8.9 08/16/2021    AVGG 197 07/16/2019       Rx verified, ordered and set to EP.

## 2022-04-20 ENCOUNTER — HOSPITAL ENCOUNTER (OUTPATIENT)
Age: 37
Discharge: HOME | End: 2022-04-20
Payer: COMMERCIAL

## 2022-04-20 DIAGNOSIS — E11.65 UNCONTROLLED TYPE 2 DIABETES MELLITUS WITH HYPERGLYCEMIA (HCC): ICD-10-CM

## 2022-04-20 DIAGNOSIS — E78.5 HYPERLIPIDEMIA, UNSPECIFIED HYPERLIPIDEMIA TYPE: ICD-10-CM

## 2022-04-20 LAB
ALBUMIN SERPL-MCNC: 4.6 G/DL (ref 3.5–5.1)
ALP BLD-CCNC: 109 U/L (ref 38–126)
ALT SERPL-CCNC: 53 U/L (ref 11–66)
ANION GAP SERPL CALCULATED.3IONS-SCNC: 13 MEQ/L (ref 8–16)
AST SERPL-CCNC: 34 U/L (ref 5–40)
AVERAGE GLUCOSE: 231 MG/DL (ref 70–126)
BILIRUB SERPL-MCNC: 0.4 MG/DL (ref 0.3–1.2)
BUN BLDV-MCNC: 11 MG/DL (ref 7–22)
CALCIUM SERPL-MCNC: 9.2 MG/DL (ref 8.5–10.5)
CHLORIDE BLD-SCNC: 94 MEQ/L (ref 98–111)
CHOLESTEROL, TOTAL: 203 MG/DL (ref 100–199)
CO2: 26 MEQ/L (ref 23–33)
CREAT SERPL-MCNC: 0.6 MG/DL (ref 0.4–1.2)
GFR SERPL CREATININE-BSD FRML MDRD: > 90 ML/MIN/1.73M2
GLUCOSE BLD-MCNC: 224 MG/DL (ref 70–108)
HBA1C MFR BLD: 9.7 % (ref 4.4–6.4)
HDLC SERPL-MCNC: 44 MG/DL
LDL CHOLESTEROL CALCULATED: 131 MG/DL
POTASSIUM SERPL-SCNC: 4.3 MEQ/L (ref 3.5–5.2)
SODIUM BLD-SCNC: 133 MEQ/L (ref 135–145)
TOTAL PROTEIN: 7.1 G/DL (ref 6.1–8)
TRIGL SERPL-MCNC: 140 MG/DL (ref 0–199)

## 2022-04-20 PROCEDURE — 83036 HEMOGLOBIN GLYCOSYLATED A1C: CPT

## 2022-04-20 PROCEDURE — 80053 COMPREHEN METABOLIC PANEL: CPT

## 2022-04-20 PROCEDURE — 36415 COLL VENOUS BLD VENIPUNCTURE: CPT

## 2022-04-20 PROCEDURE — 80061 LIPID PANEL: CPT

## 2022-04-21 ENCOUNTER — TELEPHONE (OUTPATIENT)
Dept: FAMILY MEDICINE CLINIC | Age: 37
End: 2022-04-21

## 2022-04-21 NOTE — TELEPHONE ENCOUNTER
----- Message from Ryan Gallagher MD sent at 4/21/2022  6:10 AM EDT -----  Need fu appt if none scheduled. Not urgent.

## 2022-04-21 NOTE — TELEPHONE ENCOUNTER
Patient notified of results, she is moving to [place] [address] so needs seen as soon as possible. She is scheduled tomorrow at 10:15.

## 2022-04-22 ENCOUNTER — OFFICE VISIT (OUTPATIENT)
Dept: FAMILY MEDICINE CLINIC | Age: 37
End: 2022-04-22
Payer: COMMERCIAL

## 2022-04-22 VITALS
BODY MASS INDEX: 47.4 KG/M2 | WEIGHT: 267.6 LBS | SYSTOLIC BLOOD PRESSURE: 122 MMHG | HEART RATE: 81 BPM | OXYGEN SATURATION: 98 % | DIASTOLIC BLOOD PRESSURE: 68 MMHG | TEMPERATURE: 98.2 F | RESPIRATION RATE: 18 BRPM

## 2022-04-22 DIAGNOSIS — E11.65 UNCONTROLLED TYPE 2 DIABETES MELLITUS WITH HYPERGLYCEMIA (HCC): Primary | ICD-10-CM

## 2022-04-22 DIAGNOSIS — E78.5 HYPERLIPIDEMIA, UNSPECIFIED HYPERLIPIDEMIA TYPE: ICD-10-CM

## 2022-04-22 DIAGNOSIS — I10 ESSENTIAL HYPERTENSION: ICD-10-CM

## 2022-04-22 PROCEDURE — 3046F HEMOGLOBIN A1C LEVEL >9.0%: CPT | Performed by: FAMILY MEDICINE

## 2022-04-22 PROCEDURE — 99214 OFFICE O/P EST MOD 30 MIN: CPT | Performed by: FAMILY MEDICINE

## 2022-04-22 RX ORDER — ROSUVASTATIN CALCIUM 10 MG/1
10 TABLET, COATED ORAL DAILY
Qty: 90 TABLET | Refills: 3 | Status: SHIPPED | OUTPATIENT
Start: 2022-04-22

## 2022-04-22 RX ORDER — HYDROCHLOROTHIAZIDE 25 MG/1
TABLET ORAL
Qty: 90 TABLET | Refills: 3 | Status: SHIPPED | OUTPATIENT
Start: 2022-04-22 | End: 2022-08-30

## 2022-04-22 RX ORDER — EMPAGLIFLOZIN 25 MG/1
1 TABLET, FILM COATED ORAL DAILY
Qty: 90 TABLET | Refills: 1 | Status: SHIPPED | OUTPATIENT
Start: 2022-04-22 | End: 2022-09-06

## 2022-04-22 RX ORDER — METFORMIN HYDROCHLORIDE 500 MG/1
TABLET, EXTENDED RELEASE ORAL
Qty: 120 TABLET | Refills: 3 | Status: SHIPPED | OUTPATIENT
Start: 2022-04-22

## 2022-04-22 RX ORDER — GLIPIZIDE 5 MG/1
TABLET, FILM COATED, EXTENDED RELEASE ORAL
Qty: 90 TABLET | Refills: 1 | Status: SHIPPED | OUTPATIENT
Start: 2022-04-22

## 2022-04-22 RX ORDER — METFORMIN HYDROCHLORIDE 500 MG/1
TABLET, EXTENDED RELEASE ORAL
Qty: 120 TABLET | Refills: 3 | Status: SHIPPED | OUTPATIENT
Start: 2022-04-22 | End: 2022-08-30

## 2022-04-22 ASSESSMENT — ENCOUNTER SYMPTOMS
NAUSEA: 0
CHEST TIGHTNESS: 0
EYES NEGATIVE: 1
ABDOMINAL PAIN: 0
COUGH: 0
VOMITING: 0
DIARRHEA: 0
SORE THROAT: 0
RHINORRHEA: 0
SHORTNESS OF BREATH: 0
BACK PAIN: 0

## 2022-04-22 NOTE — PROGRESS NOTES
[address] [place] [facility] [address]  Dept: [phone]  Dept Fax: [phone]  Loc: [phone]  PROGRESS NOTE      VisitDate: 4/22/2022    Sharita Farnsworth is a 43 y.o. female who presents today for:     Chief Complaint   Patient presents with    Discuss Labs    Discuss Medications     trulicity will be over $890    Other     moving next week to [address] [place] [address]         Subjective:  HPI  Follow-up diabetes hypertension and hyperlipidemia. Blood pressure and cholesterol levels are stable well-controlled with current medications. Blood sugars are elevated. She discontinued Trulicity due to cost.  Tolerating other medications well. She is moving to [place] soon. The state was accelerated by a couple months and has kind of thrown her life into a little bit of disarray making it difficult for her to eat an appropriate diabetic diet. Review of Systems   Constitutional: Negative for activity change, appetite change, fatigue and fever. HENT: Negative for congestion, rhinorrhea and sore throat. Eyes: Negative. Respiratory: Negative for cough, chest tightness and shortness of breath. Cardiovascular: Negative for chest pain and palpitations. Gastrointestinal: Negative for abdominal pain, diarrhea, nausea and vomiting. Genitourinary: Negative for dysuria and urgency. Musculoskeletal: Negative for arthralgias and back pain. Neurological: Negative for dizziness and headaches. Psychiatric/Behavioral: Negative for dysphoric mood. The patient is not nervous/anxious. Past Medical History:   Diagnosis Date    Diabetes mellitus ([address]     Hypertension       History reviewed. No pertinent surgical history.   Family History   Problem Relation Age of Onset    Diabetes Mother     Depression Mother     High Blood Pressure Father     Other Father     High Blood Pressure Paternal Grandmother      Social History     Tobacco Use    Smoking status: Former Smoker     Years: 2.00     Types: Cigarettes     Quit date: 10/1/2016     Years since quittin.5    Smokeless tobacco: Never Used   Substance Use Topics    Alcohol use: Yes     Comment: social      Current Outpatient Medications   Medication Sig Dispense Refill    metFORMIN (GLUCOPHAGE-XR) 500 MG extended release tablet TAKE 2 TABLETS BY MOUTH TWICE DAILY 120 tablet 3    glipiZIDE (GLUCOTROL XL) 5 MG extended release tablet TAKE 1 TABLET BY MOUTH EVERY DAY WITH BREAKFAST 90 tablet 1    rosuvastatin (CRESTOR) 10 MG tablet Take 1 tablet by mouth daily 90 tablet 3    hydroCHLOROthiazide (HYDRODIURIL) 25 MG tablet TAKE 1 TABLET BY MOUTH EVERY DAY WITH LOSARTAN 90 tablet 3    empagliflozin (JARDIANCE) 25 MG tablet Take 1 tablet by mouth daily 90 tablet 1    losartan (COZAAR) 100 MG tablet TAKE 1 TABLET BY MOUTH EVERY DAY WITH HYDROCHLOROTHIAZIDE 90 tablet 3    buPROPion (WELLBUTRIN XL) 150 MG extended release tablet Take 1 tablet by mouth every morning 90 tablet 3    metFORMIN (GLUCOPHAGE-XR) 500 MG extended release tablet TAKE 2 TABLETS BY MOUTH TWICE DAILY 120 tablet 3     No current facility-administered medications for this visit.      Allergies   Allergen Reactions    Fluconazole Rash     Health Maintenance   Topic Date Due    Pneumococcal 0-64 years Vaccine (1 - PCV) Never done    Diabetic foot exam  Never done    Diabetic retinal exam  Never done    Hepatitis B vaccine (1 of 3 - Risk 3-dose series) Never done    DTaP/Tdap/Td vaccine (5 - Tdap) 2004    Cervical cancer screen  Never done    A1C test (Diabetic or Prediabetic)  2022    Depression Monitoring  2022    Diabetic microalbuminuria test  2022    Lipids  2023    Potassium  2023    Creatinine  2023    Flu vaccine  Completed    COVID-19 Vaccine  Completed    Hepatitis A vaccine  Aged Out    Hib vaccine  Aged Out    Meningococcal (ACWY) vaccine  Aged Out    Varicella vaccine Discontinued    Hepatitis C screen  Discontinued    HIV screen  Discontinued         Objective:     Physical Exam  Constitutional:       General: She is not in acute distress. Appearance: She is well-developed. She is not diaphoretic. HENT:      Head: Normocephalic and atraumatic. Eyes:      General: No scleral icterus. Conjunctiva/sclera: Conjunctivae normal.   Neck:      Thyroid: No thyromegaly. Vascular: No JVD. Comments: No bruits  Cardiovascular:      Rate and Rhythm: Normal rate and regular rhythm. Heart sounds: Normal heart sounds. Pulmonary:      Effort: Pulmonary effort is normal. No respiratory distress. Breath sounds: Normal breath sounds. No wheezing or rales. Abdominal:      Palpations: Abdomen is soft. There is no mass. Tenderness: There is no abdominal tenderness. There is no guarding. Musculoskeletal:         General: No tenderness. Skin:     General: Skin is warm and dry. Findings: No rash. Neurological:      Mental Status: She is alert and oriented to person, place, and time. Cranial Nerves: No cranial nerve deficit. /68 (Site: Left Upper Arm)   Pulse 81   Temp 98.2 °F (36.8 °C) (Oral)   Resp 18   Wt 267 lb 9.6 oz (121.4 kg)   LMP 03/27/2022   SpO2 98%   BMI 47.40 kg/m²       Impression/Plan:  1. Uncontrolled type 2 diabetes mellitus with hyperglycemia ([facility] [address] [facility]    2. Essential hypertension    3.  Hyperlipidemia, unspecified hyperlipidemia type      Requested Prescriptions     Signed Prescriptions Disp Refills    metFORMIN (GLUCOPHAGE-XR) 500 MG extended release tablet 120 tablet 3     Sig: TAKE 2 TABLETS BY MOUTH TWICE DAILY    glipiZIDE (GLUCOTROL XL) 5 MG extended release tablet 90 tablet 1     Sig: TAKE 1 TABLET BY MOUTH EVERY DAY WITH BREAKFAST    rosuvastatin (CRESTOR) 10 MG tablet 90 tablet 3     Sig: Take 1 tablet by mouth daily    hydroCHLOROthiazide (HYDRODIURIL) 25 MG tablet 90 tablet 3     Sig: TAKE 1 TABLET BY MOUTH EVERY DAY WITH LOSARTAN    empagliflozin (JARDIANCE) 25 MG tablet 90 tablet 1     Sig: Take 1 tablet by mouth daily     Orders Placed This Encounter   Procedures    Hemoglobin A1C     Standing Status:   Future     Standing Expiration Date:   4/22/2023    Comprehensive Metabolic Panel     Standing Status:   Future     Standing Expiration Date:   4/22/2023    Microalbumin / Creatinine Urine Ratio     Standing Status:   Future     Standing Expiration Date:   4/22/2023    Lipid Panel     Standing Status:   Future     Standing Expiration Date:   4/22/2023     Order Specific Question:   Is Patient Fasting?/# of Hours     Answer:   yes/12   Counseled on diet and exercise for glucose control. Prescription and discount card for Jardiance provided. Provided orders for labs. Establish with physician in her new place of residence as soon as possible. She is yet to secure employment in this area so her ability to get insurance is tenuous. If she is unable to get established in less than 3 months we asked her to get the blood work done and call us for results    Patient giveneducational materials - see patient instructions. Discussed use, benefit, and side effects of prescribed medications. All patient questions answered. Pt voiced understanding. Reviewed health maintenance. Patient agreedwith treatment plan. Follow up as directed. **This report has been created using voice recognition software. It may contain minor errorswhich are inherent in voice recognition technology. **       Electronically signed by Micheline Gonsales MD on 4/22/2022 at 3:03 PM

## 2022-08-30 RX ORDER — HYDROCHLOROTHIAZIDE 25 MG/1
TABLET ORAL
Qty: 90 TABLET | Refills: 1 | Status: SHIPPED | OUTPATIENT
Start: 2022-08-30

## 2022-08-30 RX ORDER — METFORMIN HYDROCHLORIDE 500 MG/1
TABLET, EXTENDED RELEASE ORAL
Qty: 360 TABLET | Refills: 1 | Status: SHIPPED | OUTPATIENT
Start: 2022-08-30

## 2022-09-06 RX ORDER — EMPAGLIFLOZIN 25 MG/1
TABLET, FILM COATED ORAL DAILY
Qty: 90 TABLET | Refills: 1 | Status: SHIPPED | OUTPATIENT
Start: 2022-09-06

## 2022-11-30 RX ORDER — GLIPIZIDE 5 MG/1
TABLET, FILM COATED, EXTENDED RELEASE ORAL
Qty: 90 TABLET | Refills: 0 | Status: SHIPPED | OUTPATIENT
Start: 2022-11-30

## 2023-02-28 RX ORDER — ROSUVASTATIN CALCIUM 10 MG/1
10 TABLET, COATED ORAL DAILY
Qty: 90 TABLET | Refills: 3 | OUTPATIENT
Start: 2023-02-28

## 2023-02-28 RX ORDER — GLIPIZIDE 5 MG/1
TABLET, FILM COATED, EXTENDED RELEASE ORAL
Qty: 90 TABLET | Refills: 0 | OUTPATIENT
Start: 2023-02-28

## 2023-02-28 RX ORDER — BUPROPION HYDROCHLORIDE 150 MG/1
150 TABLET ORAL EVERY MORNING
Qty: 90 TABLET | Refills: 3 | OUTPATIENT
Start: 2023-02-28

## 2023-02-28 RX ORDER — LOSARTAN POTASSIUM 100 MG/1
TABLET ORAL
Qty: 90 TABLET | Refills: 3 | OUTPATIENT
Start: 2023-02-28

## 2023-02-28 RX ORDER — EMPAGLIFLOZIN 25 MG/1
TABLET, FILM COATED ORAL DAILY
Qty: 90 TABLET | Refills: 1 | Status: SHIPPED | OUTPATIENT
Start: 2023-02-28

## 2023-03-06 RX ORDER — HYDROCHLOROTHIAZIDE 25 MG/1
TABLET ORAL
Qty: 30 TABLET | Refills: 0 | Status: SHIPPED | OUTPATIENT
Start: 2023-03-06

## 2023-04-21 RX ORDER — METFORMIN HYDROCHLORIDE 500 MG/1
1000 TABLET, EXTENDED RELEASE ORAL 2 TIMES DAILY
Qty: 360 TABLET | Refills: 1 | OUTPATIENT
Start: 2023-04-21

## 2023-05-30 RX ORDER — METFORMIN HYDROCHLORIDE 500 MG/1
TABLET, EXTENDED RELEASE ORAL
Qty: 120 TABLET | Refills: 3 | Status: SHIPPED | OUTPATIENT
Start: 2023-05-30

## 2023-05-30 RX ORDER — METFORMIN HYDROCHLORIDE 500 MG/1
TABLET, EXTENDED RELEASE ORAL
Qty: 360 TABLET | Refills: 3 | Status: SHIPPED | OUTPATIENT
Start: 2023-05-30

## 2023-08-24 RX ORDER — EMPAGLIFLOZIN 25 MG/1
TABLET, FILM COATED ORAL DAILY
Qty: 90 TABLET | Refills: 1 | OUTPATIENT
Start: 2023-08-24

## 2023-08-24 RX ORDER — BUPROPION HYDROCHLORIDE 150 MG/1
150 TABLET ORAL EVERY MORNING
Qty: 30 TABLET | OUTPATIENT
Start: 2023-08-24

## 2023-09-06 RX ORDER — HYDROCHLOROTHIAZIDE 25 MG/1
TABLET ORAL
Qty: 90 TABLET | OUTPATIENT
Start: 2023-09-06